# Patient Record
Sex: MALE | Race: WHITE | Employment: FULL TIME | URBAN - METROPOLITAN AREA
[De-identification: names, ages, dates, MRNs, and addresses within clinical notes are randomized per-mention and may not be internally consistent; named-entity substitution may affect disease eponyms.]

---

## 2017-03-13 PROBLEM — K21.9 GASTROESOPHAGEAL REFLUX DISEASE WITHOUT ESOPHAGITIS: Status: ACTIVE | Noted: 2017-03-13

## 2017-03-13 PROBLEM — F41.1 GAD (GENERALIZED ANXIETY DISORDER): Status: ACTIVE | Noted: 2017-03-13

## 2017-03-13 PROBLEM — E78.2 MIXED HYPERLIPIDEMIA: Status: ACTIVE | Noted: 2017-03-13

## 2017-09-22 PROBLEM — M54.9 CHRONIC BACK PAIN: Status: ACTIVE | Noted: 2017-09-22

## 2017-09-22 PROBLEM — G47.30 SLEEP APNEA: Status: ACTIVE | Noted: 2017-09-22

## 2017-09-22 PROBLEM — G89.29 CHRONIC BACK PAIN: Status: ACTIVE | Noted: 2017-09-22

## 2018-07-27 ENCOUNTER — HOSPITAL ENCOUNTER (OUTPATIENT)
Age: 58
Discharge: HOME | End: 2018-07-29

## 2018-07-27 PROCEDURE — 88342 IMHCHEM/IMCYTCHM 1ST ANTB: CPT

## 2018-07-27 PROCEDURE — 88305 TISSUE EXAM BY PATHOLOGIST: CPT

## 2019-05-07 ENCOUNTER — HOSPITAL ENCOUNTER (OUTPATIENT)
Age: 59
Discharge: HOME | End: 2019-05-09
Payer: COMMERCIAL

## 2019-05-07 PROCEDURE — 87088 URINE BACTERIA CULTURE: CPT

## 2019-05-09 LAB — URINE CULTURE, ROUTINE: NORMAL

## 2019-05-13 ENCOUNTER — HOSPITAL ENCOUNTER (OUTPATIENT)
Age: 59
Discharge: HOME | End: 2019-05-15

## 2019-05-13 LAB
ABO/RH: NORMAL
ANTIBODY SCREEN: NORMAL

## 2019-05-13 PROCEDURE — 86850 RBC ANTIBODY SCREEN: CPT

## 2019-05-13 PROCEDURE — 86901 BLOOD TYPING SEROLOGIC RH(D): CPT

## 2019-05-13 PROCEDURE — 86900 BLOOD TYPING SEROLOGIC ABO: CPT

## 2019-11-12 ENCOUNTER — HOSPITAL ENCOUNTER (OUTPATIENT)
Age: 59
Discharge: HOME | End: 2019-11-14
Payer: COMMERCIAL

## 2019-11-12 DIAGNOSIS — E78.1 HYPERTRIGLYCERIDEMIA: ICD-10-CM

## 2019-11-12 LAB
ALBUMIN SERPL-MCNC: 4.7 G/DL (ref 3.5–5.2)
ALP BLD-CCNC: 80 U/L (ref 40–129)
ALT SERPL-CCNC: 78 U/L (ref 0–40)
ANION GAP SERPL CALCULATED.3IONS-SCNC: 16 MMOL/L (ref 7–16)
AST SERPL-CCNC: 50 U/L (ref 0–39)
BILIRUB SERPL-MCNC: 0.3 MG/DL (ref 0–1.2)
BUN BLDV-MCNC: 16 MG/DL (ref 6–20)
CALCIUM SERPL-MCNC: 10 MG/DL (ref 8.6–10.2)
CHLORIDE BLD-SCNC: 101 MMOL/L (ref 98–107)
CHOLESTEROL, TOTAL: 187 MG/DL (ref 0–199)
CO2: 23 MMOL/L (ref 22–29)
CREAT SERPL-MCNC: 1.1 MG/DL (ref 0.7–1.2)
GFR AFRICAN AMERICAN: >60
GFR NON-AFRICAN AMERICAN: >60 ML/MIN/1.73
GLUCOSE BLD-MCNC: 123 MG/DL (ref 74–99)
HDLC SERPL-MCNC: 31 MG/DL
LDL CHOLESTEROL CALCULATED: 102 MG/DL (ref 0–99)
POTASSIUM SERPL-SCNC: 4.6 MMOL/L (ref 3.5–5)
SODIUM BLD-SCNC: 140 MMOL/L (ref 132–146)
TOTAL PROTEIN: 8 G/DL (ref 6.4–8.3)
TRIGL SERPL-MCNC: 268 MG/DL (ref 0–149)
VLDLC SERPL CALC-MCNC: 54 MG/DL

## 2019-11-12 PROCEDURE — 80061 LIPID PANEL: CPT

## 2019-11-12 PROCEDURE — 80053 COMPREHEN METABOLIC PANEL: CPT

## 2020-11-23 DIAGNOSIS — E78.2 MIXED HYPERLIPIDEMIA: ICD-10-CM

## 2020-11-23 PROBLEM — E66.813 CLASS 3 SEVERE OBESITY DUE TO EXCESS CALORIES WITH SERIOUS COMORBIDITY AND BODY MASS INDEX (BMI) OF 40.0 TO 44.9 IN ADULT (HCC): Status: ACTIVE | Noted: 2020-11-23

## 2020-11-23 PROBLEM — E66.01 CLASS 3 SEVERE OBESITY DUE TO EXCESS CALORIES WITH SERIOUS COMORBIDITY AND BODY MASS INDEX (BMI) OF 40.0 TO 44.9 IN ADULT (HCC): Status: ACTIVE | Noted: 2020-11-23

## 2020-11-23 PROBLEM — F17.210 SMOKING GREATER THAN 30 PACK YEARS: Status: ACTIVE | Noted: 2020-11-23

## 2020-11-23 LAB
ALBUMIN SERPL-MCNC: 4.7 G/DL (ref 3.5–5.2)
ALP BLD-CCNC: 68 U/L (ref 40–129)
ALT SERPL-CCNC: 62 U/L (ref 0–40)
ANION GAP SERPL CALCULATED.3IONS-SCNC: 13 MMOL/L (ref 7–16)
AST SERPL-CCNC: 42 U/L (ref 0–39)
BILIRUB SERPL-MCNC: 0.3 MG/DL (ref 0–1.2)
BUN BLDV-MCNC: 17 MG/DL (ref 8–23)
CALCIUM SERPL-MCNC: 10.3 MG/DL (ref 8.6–10.2)
CHLORIDE BLD-SCNC: 103 MMOL/L (ref 98–107)
CHOLESTEROL, TOTAL: 206 MG/DL (ref 0–199)
CO2: 27 MMOL/L (ref 22–29)
CREAT SERPL-MCNC: 1.1 MG/DL (ref 0.7–1.2)
GFR AFRICAN AMERICAN: >60
GFR NON-AFRICAN AMERICAN: >60 ML/MIN/1.73
GLUCOSE BLD-MCNC: 108 MG/DL (ref 74–99)
HDLC SERPL-MCNC: 43 MG/DL
LDL CHOLESTEROL CALCULATED: 140 MG/DL (ref 0–99)
POTASSIUM SERPL-SCNC: 4.8 MMOL/L (ref 3.5–5)
SODIUM BLD-SCNC: 143 MMOL/L (ref 132–146)
TOTAL PROTEIN: 7.5 G/DL (ref 6.4–8.3)
TRIGL SERPL-MCNC: 116 MG/DL (ref 0–149)
VLDLC SERPL CALC-MCNC: 23 MG/DL

## 2021-02-19 ENCOUNTER — TELEPHONE (OUTPATIENT)
Dept: ADMINISTRATIVE | Age: 61
End: 2021-02-19

## 2021-02-19 NOTE — TELEPHONE ENCOUNTER
Patient Appointment Form:      PCP: Dr. Daniela Katz  Referring: Dr. Daniela Katz    Has the Patient:    Seen a Cardiologist? yes    date:7/9/14  Physician:Pt can't remember  location:[facility]    Had a heart catheterization? no    Had heart surgery? no    Had a stress test or nuclear stress test? yes   date: 7/9/14   facility name:  [address] [place] [address]    Had an echocardiogram? no    Had a vascular ultrasound? no    Had a 24/48 heart monitor or extended cardiac event monitor? no    Had recent blood work in the last 6 months? yes    date: 11/23/20    ordering physician: Bowen Lainez    Had a pacemaker/ICD/ILR implant? no    Seen an Electrophysiologist? no        Will send records via: in [address] [place] [address]      Date & time of appointment:  2/25/21 @ 1:00

## 2021-02-25 ENCOUNTER — OFFICE VISIT (OUTPATIENT)
Dept: CARDIOLOGY CLINIC | Age: 61
End: 2021-02-25
Payer: COMMERCIAL

## 2021-02-25 VITALS
SYSTOLIC BLOOD PRESSURE: 138 MMHG | HEART RATE: 74 BPM | DIASTOLIC BLOOD PRESSURE: 70 MMHG | HEIGHT: 70 IN | RESPIRATION RATE: 18 BRPM | WEIGHT: 284.9 LBS | BODY MASS INDEX: 40.79 KG/M2

## 2021-02-25 DIAGNOSIS — R07.9 CHEST PAIN, UNSPECIFIED TYPE: ICD-10-CM

## 2021-02-25 DIAGNOSIS — I10 ESSENTIAL HYPERTENSION: Primary | ICD-10-CM

## 2021-02-25 PROCEDURE — 93000 ELECTROCARDIOGRAM COMPLETE: CPT | Performed by: INTERNAL MEDICINE

## 2021-02-25 PROCEDURE — 99244 OFF/OP CNSLTJ NEW/EST MOD 40: CPT | Performed by: INTERNAL MEDICINE

## 2021-02-25 RX ORDER — ASPIRIN 81 MG/1
81 TABLET ORAL DAILY
COMMUNITY

## 2021-02-25 NOTE — PROGRESS NOTES
OUTPATIENT CARDIOLOGY CONSULT    Name: Cosmo Goltz    Age: 61 y.o. Date of Service: 2021    Reason for Consultation: Chest pain    Referring Physician: Jenny Michelle MD    History of Present Illness:  Cosmo Goltz is a 61 y.o. male who presents today for further evaluation of chest pain. He has multiple cardiac risk factors including obesity, hypertension, hyperlipidemia and family history of premature CAD. He is going through a separation from his girlfriend of 19 years. With all this going on, he has been getting some left-sided chest pressure and tightness, not exertional but has been brought on with stress. Some radiation to the back, no other associated symptoms. Last about 15 minutes. Last stress test was in . He tells me he is flying to [place] on Tuesday of next week. Review of Systems:  Complete review of systems otherwise negative except as described above.     Past Medical History:  Past Medical History:   Diagnosis Date    BMI 40.0-44.9, adult ([facility] [address] 2017    Chronic back pain     TIM (generalized anxiety disorder)     GERD (gastroesophageal reflux disease)     Hyperlipidemia     Hypertension     Sleep apnea        Past Surgical History:  Past Surgical History:   Procedure Laterality Date    APPENDECTOMY      COLONOSCOPY         Family History:  Family History   Problem Relation Age of Onset    Other Mother         dementia    Heart Disease Mother     Heart Disease Father     No Known Problems Sister     No Known Problems Brother     No Known Problems Sister     No Known Problems Sister    Father MI at 50  Mom valve replacement 62s      Social History:  Social History     Tobacco Use    Smoking status: Former Smoker     Packs/day: 3.00     Years: 20.00     Pack years: 60.00     Types: Cigars     Quit date: 10/3/1994     Years since quittin.4    Smokeless tobacco: Never Used    Tobacco comment: smokes 2 cigars a month and quit cigs 23 years ago   Substance Use Topics    Alcohol use: Yes     Comment: social     Drug use: No        Allergies: Allergies   Allergen Reactions    Shrimp Flavor Hives       Current Medications:    Current Outpatient Medications:     aspirin 81 MG EC tablet, Take 81 mg by mouth daily, Disp: , Rfl:     naproxen (NAPROSYN) 500 MG tablet, Take 1 tablet by mouth 2 times daily (with meals) (Patient taking differently: Take 500 mg by mouth as needed ), Disp: 180 tablet, Rfl: 3    omeprazole (PRILOSEC) 20 MG delayed release capsule, Take 1 capsule by mouth daily, Disp: 90 capsule, Rfl: 3    rosuvastatin (CRESTOR) 20 MG tablet, Take 1 tablet by mouth daily, Disp: 90 tablet, Rfl: 3    telmisartan (MICARDIS) 80 MG tablet, Take 1 tablet by mouth daily, Disp: 90 tablet, Rfl: 3    fenofibrate (TRIGLIDE) 160 MG tablet, TAKE 1 TABLET DAILY, Disp: 90 tablet, Rfl: 3    Cholecalciferol (VITAMIN D3) 5000 units TABS, Take 5,000 Units by mouth daily, Disp: , Rfl:     Omega-3 Fatty Acids (FISH OIL) 1000 MG CAPS, Take 1,000 mg by mouth daily, Disp: , Rfl:     CPAP Machine MISC, Please provide patient with an auto CPAP with ranges 5-15 cm water pressure with C-Flex of 3 and heated humidification. Please also provide mask, tubing, and supplies to patient fit and comfort. Dx:[facility]CH Orders faxed to [facility], Disp: 1 each, Rfl: 0    Physical Exam:  /70   Pulse 74   Resp 18   Ht 5' 10\" (1.778 m)   Wt 284 lb 14.4 oz (129.2 kg)   BMI 40.88 kg/m²   Wt Readings from Last 3 Encounters:   02/25/21 284 lb 14.4 oz (129.2 kg)   02/19/21 287 lb (130.2 kg)   11/23/20 282 lb (127.9 kg)     Appearance: Overweight male, awake, alert, no acute respiratory distress  Skin: Intact, no rash  Eyes: EOMI, no conjunctival erythema  ENMT: Moist mucous membranes. Neck: Supple, no elevated JVP, no carotid bruits  Lungs: Clear to auscultation bilaterally. No wheezes, rales, or rhonchi. Cardiac: Regular rhythm with a normal rate.   S1 & S2 normal, no murmurs  Abdomen: Soft, nontender, +bowel sounds  Extremities: Moves all extremities x 4, no lower extremity edema  Neurologic: No focal motor deficits apparent, normal mood and affect  Peripheral Pulses: Intact posterior tibial pulses bilaterally    Laboratory Tests:  Lab Results   Component Value Date    CREATININE 1.1 11/23/2020    BUN 17 11/23/2020     11/23/2020    K 4.8 11/23/2020     11/23/2020    CO2 27 11/23/2020     Lab Results   Component Value Date    MG 2.1 12/18/2017     Lab Results   Component Value Date    WBC 7.6 03/06/2017    HGB 15.3 03/06/2017    HCT 45.9 03/06/2017    MCV 90.9 03/06/2017     03/06/2017     Lab Results   Component Value Date    ALT 62 (H) 11/23/2020    AST 42 (H) 11/23/2020    ALKPHOS 68 11/23/2020    BILITOT 0.3 11/23/2020     Lab Results   Component Value Date    CKTOTAL 253 (A) 12/18/2017     No results found for: INR, PROTIME  Lab Results   Component Value Date    TSH 2.590 03/06/2017     Lab Results   Component Value Date    LABA1C 5.6 06/19/2018     No results found for: EAG  Lab Results   Component Value Date    CHOL 206 (H) 11/23/2020    CHOL 187 11/12/2019    CHOL 185 06/19/2018     Lab Results   Component Value Date    TRIG 116 11/23/2020    TRIG 268 (H) 11/12/2019    TRIG 171 (A) 06/19/2018     Lab Results   Component Value Date    HDL 43 11/23/2020    HDL 31 11/12/2019    HDL 33 (A) 06/19/2018     Lab Results   Component Value Date    LDLCALC 140 (H) 11/23/2020    LDLCALC 102 (H) 11/12/2019    LDLCALC 123 (A) 06/19/2018     Lab Results   Component Value Date    LABVLDL 23 11/23/2020    LABVLDL 54 11/12/2019    LABVLDL - (AA) 03/06/2017     Lab Results   Component Value Date    CHOLHDLRATIO 5.6 (A) 06/19/2018    CHOLHDLRATIO 5.4 12/18/2017     No results for input(s): PROBNP in the last 72 hours. Cardiac Tests:  ECG: Sinus rhythm 74 bpm.  Normal axis normal intervals. No ST or T wave changes.     Echocardiogram:     Stress test:    Exercise treadmill stress test 2014  9 minutes Yariel protocol, 97% MPHR, 10.1 METS  No ST changes, low risk study    Cardiac catheterization:     Orders Placed This Encounter   Procedures    NM Cardiac Stress Test Nuclear Imaging    Cardiac Stress Test Exercise- Treadmill    EKG 12 Lead        Requested Prescriptions      No prescriptions requested or ordered in this encounter        ASSESSMENT / PLAN:  1. Chest pain, multiple cardiac risk factors. Baseline EKG unremarkable  2. Hypertension, running slightly high  3. Hyperlipidemia, on statin  4. Obstructive sleep apnea, compliant with CPAP  5. Obesity, BMI 40.9 kg/m²  6. GERD  7. Chronic back pain  8. Prior tobacco abuse    Recommendations:  Repeat ischemic evaluation is warranted. · Exercise nuclear stress test  · Continue aspirin statin  · Ideally would get this done before he travels to [place] and this was explained to him  · Aggressive risk factor modification including weight loss recommended  · Further recommendations pending above    Thank you for allowing me to participate in your patient's care. Please feel free to contact me if you have any questions or concerns.       Gregory Thomas MD, [facility] Card[facility]

## 2021-02-26 ENCOUNTER — TELEPHONE (OUTPATIENT)
Dept: CARDIOLOGY | Age: 61
End: 2021-02-26

## 2021-02-26 NOTE — TELEPHONE ENCOUNTER
02/26/2021, pt given nuclear tm instructions for 3/01/21and covid checklist reviewed as well sharon rn

## 2021-03-01 ENCOUNTER — HOSPITAL ENCOUNTER (OUTPATIENT)
Dept: CARDIOLOGY | Age: 61
Discharge: HOME | End: 2021-03-01
Payer: COMMERCIAL

## 2021-03-01 VITALS
BODY MASS INDEX: 40.66 KG/M2 | OXYGEN SATURATION: 98 % | TEMPERATURE: 96.9 F | WEIGHT: 284 LBS | SYSTOLIC BLOOD PRESSURE: 122 MMHG | DIASTOLIC BLOOD PRESSURE: 80 MMHG | RESPIRATION RATE: 20 BRPM | HEART RATE: 78 BPM | HEIGHT: 70 IN

## 2021-03-01 DIAGNOSIS — R07.9 CHEST PAIN, UNSPECIFIED TYPE: ICD-10-CM

## 2021-03-01 LAB
LV EF: 71 %
LVEF MODALITY: NORMAL

## 2021-03-01 PROCEDURE — 93017 CV STRESS TEST TRACING ONLY: CPT

## 2021-03-01 PROCEDURE — 3430000000 HC RX DIAGNOSTIC RADIOPHARMACEUTICAL: Performed by: INTERNAL MEDICINE

## 2021-03-01 PROCEDURE — 99999 PR OFFICE/OUTPT VISIT,PROCEDURE ONLY: CPT | Performed by: INTERNAL MEDICINE

## 2021-03-01 PROCEDURE — A9502 TC99M TETROFOSMIN: HCPCS | Performed by: INTERNAL MEDICINE

## 2021-03-01 PROCEDURE — 78452 HT MUSCLE IMAGE SPECT MULT: CPT

## 2021-03-01 PROCEDURE — 2580000003 HC RX 258: Performed by: INTERNAL MEDICINE

## 2021-03-01 RX ORDER — SODIUM CHLORIDE 0.9 % (FLUSH) 0.9 %
10 SYRINGE (ML) INJECTION PRN
Status: DISCONTINUED | OUTPATIENT
Start: 2021-03-01 | End: 2021-03-02 | Stop reason: HOSPADM

## 2021-03-01 RX ADMIN — TETROFOSMIN 30.9 MILLICURIE: 0.23 INJECTION, POWDER, LYOPHILIZED, FOR SOLUTION INTRAVENOUS at 09:38

## 2021-03-01 RX ADMIN — TETROFOSMIN 11.2 MILLICURIE: 0.23 INJECTION, POWDER, LYOPHILIZED, FOR SOLUTION INTRAVENOUS at 07:46

## 2021-03-01 RX ADMIN — SODIUM CHLORIDE, PRESERVATIVE FREE 10 ML: 5 INJECTION INTRAVENOUS at 07:46

## 2021-03-01 RX ADMIN — SODIUM CHLORIDE, PRESERVATIVE FREE 10 ML: 5 INJECTION INTRAVENOUS at 09:38

## 2021-03-01 NOTE — PROCEDURES
[id number] [facility] [address] and Vascular Lab - [address]. Yuri wilson, [address] [place] [address]  [phone]                  Exercise Stress Nuclear Gated SPECT Study    Name: Ryonet Account Number: [de-identified]    :  1960      Sex: male              Date of Study:  3/1/2021    Height: 5' 10\" (177.8 cm)  Weight: 284 lb (128.8 kg)     Ordering Provider: Charlcie Heimlich Cuadra,MD          PCP: Carolina Cardona MD      Cardiologist: Jacqueline Zacarias MD                        Interpreting Physician: Pal Berg MD  _________________________________________________________________________________    Indication:   Chest Pain    Clinical History:   Patient has no known history of coronary artery disease. Resting ECG:    SR and is normal    Exercise: The patient exercised using a Yariel protocol, completing 7:01 minutes and reaching an estimated work load of 95.8 metabolic equivalents (METS). Resting HR was 80. Peak exercise heart rate was 154 ( 96% of maximum predicted heart rate for age). Baseline /80. Peak exercise /70. The blood pressure response to exercise was normal      Exercise was terminated due to heart rate attained. The patient experienced chest tightnes and mild shortness of breath with exercise, which improved with rest.       Pulse oximetry was used to monitor oxygen saturation during the stress test.  The study was performed on Room Air. The resting pulse oximeter was 98%. The post stress O2 saturation seen during exercise was 97 %. Exercise ECG:   The patient demonstrated no arrhythmias during exercise. With exercise, there were no ST segment changes of significance at the heart rate achieved. IMAGING: Myocardial perfusion imaging was performed at rest 30-35 minutes following the intravenous injection of 11.2 mCi of (Tc-tetrofosmin) followed by 10 ml of Normal Saline.   At peak exercise, the patient was injected intravenously with 30.9 mCi of (Tc-tetrofosmin) followed by 10 ml of Normal Saline. Gated post-stress tomographic imaging was performed 20-25 minutes after stress. FINDINGS: The overall quality of the study was good. Left ventricular cavity size was noted to be mildly enlarged on both rest and stress studies. Rotational analog analysis demonstrated abnormal patient motion. A mild defect was present in the inferior wall(s) that was  small size on the post stress images. The resting images  show no change. Gated SPECT left ventricular ejection fraction was calculated to be 71%, with normal myocardial thickening and wall motion. Impression:    1. Exercise EKG was normal.  2. The patient experienced chest tightness with exercise not typical of angina. 3. The myocardial perfusion imaging was normal with attenuation artifact. 4. Overall left ventricular systolic function was normal without regional wall motion abnormalities. 5. Exercise capacity was average. 6. Low risk general exercise nuclear stress test.    Thank you for sending your patient to this [employer].      Electronically signed by Jojo Brooks MD on 3/1/2021 at 4:44 PM

## 2021-03-02 ENCOUNTER — APPOINTMENT (OUTPATIENT)
Dept: GENERAL RADIOLOGY | Age: 61
End: 2021-03-02
Payer: COMMERCIAL

## 2021-03-02 ENCOUNTER — HOSPITAL ENCOUNTER (OUTPATIENT)
Age: 61
Setting detail: OBSERVATION
Discharge: HOME | End: 2021-03-03
Attending: EMERGENCY MEDICINE | Admitting: INTERNAL MEDICINE
Payer: COMMERCIAL

## 2021-03-02 DIAGNOSIS — R07.9 CHEST PAIN, UNSPECIFIED TYPE: Primary | ICD-10-CM

## 2021-03-02 LAB
ALBUMIN SERPL-MCNC: 4.5 G/DL (ref 3.5–5.2)
ALP BLD-CCNC: 51 U/L (ref 40–129)
ALT SERPL-CCNC: 51 U/L (ref 0–40)
ANION GAP SERPL CALCULATED.3IONS-SCNC: 10 MMOL/L (ref 7–16)
APTT: 33.8 SEC (ref 24.5–35.1)
AST SERPL-CCNC: 39 U/L (ref 0–39)
BASOPHILS ABSOLUTE: 0.02 E9/L (ref 0–0.2)
BASOPHILS RELATIVE PERCENT: 0.3 % (ref 0–2)
BILIRUB SERPL-MCNC: 0.5 MG/DL (ref 0–1.2)
BUN BLDV-MCNC: 20 MG/DL (ref 8–23)
CALCIUM SERPL-MCNC: 9.7 MG/DL (ref 8.6–10.2)
CHLORIDE BLD-SCNC: 102 MMOL/L (ref 98–107)
CO2: 27 MMOL/L (ref 22–29)
CREAT SERPL-MCNC: 1.1 MG/DL (ref 0.7–1.2)
EKG ATRIAL RATE: 76 BPM
EKG P AXIS: 40 DEGREES
EKG P-R INTERVAL: 168 MS
EKG Q-T INTERVAL: 382 MS
EKG QRS DURATION: 94 MS
EKG QTC CALCULATION (BAZETT): 429 MS
EKG R AXIS: 14 DEGREES
EKG T AXIS: 43 DEGREES
EKG VENTRICULAR RATE: 76 BPM
EOSINOPHILS ABSOLUTE: 0.17 E9/L (ref 0.05–0.5)
EOSINOPHILS RELATIVE PERCENT: 2.8 % (ref 0–6)
GFR AFRICAN AMERICAN: >60
GFR NON-AFRICAN AMERICAN: >60 ML/MIN/1.73
GLUCOSE BLD-MCNC: 129 MG/DL (ref 74–99)
HCT VFR BLD CALC: 46.1 % (ref 37–54)
HEMOGLOBIN: 15.6 G/DL (ref 12.5–16.5)
IMMATURE GRANULOCYTES #: 0.02 E9/L
IMMATURE GRANULOCYTES %: 0.3 % (ref 0–5)
INR BLD: 1.1
LYMPHOCYTES ABSOLUTE: 1.43 E9/L (ref 1.5–4)
LYMPHOCYTES RELATIVE PERCENT: 23.3 % (ref 20–42)
MCH RBC QN AUTO: 30.7 PG (ref 26–35)
MCHC RBC AUTO-ENTMCNC: 33.8 % (ref 32–34.5)
MCV RBC AUTO: 90.7 FL (ref 80–99.9)
MONOCYTES ABSOLUTE: 0.54 E9/L (ref 0.1–0.95)
MONOCYTES RELATIVE PERCENT: 8.8 % (ref 2–12)
NEUTROPHILS ABSOLUTE: 3.96 E9/L (ref 1.8–7.3)
NEUTROPHILS RELATIVE PERCENT: 64.5 % (ref 43–80)
PDW BLD-RTO: 13.4 FL (ref 11.5–15)
PLATELET # BLD: 255 E9/L (ref 130–450)
PMV BLD AUTO: 10.4 FL (ref 7–12)
POTASSIUM SERPL-SCNC: 4.2 MMOL/L (ref 3.5–5)
PROTHROMBIN TIME: 11.8 SEC (ref 9.3–12.4)
RBC # BLD: 5.08 E12/L (ref 3.8–5.8)
SODIUM BLD-SCNC: 139 MMOL/L (ref 132–146)
TOTAL PROTEIN: 7.2 G/DL (ref 6.4–8.3)
TROPONIN: <0.01 NG/ML (ref 0–0.03)
WBC # BLD: 6.1 E9/L (ref 4.5–11.5)

## 2021-03-02 PROCEDURE — 96374 THER/PROPH/DIAG INJ IV PUSH: CPT

## 2021-03-02 PROCEDURE — 85730 THROMBOPLASTIN TIME PARTIAL: CPT

## 2021-03-02 PROCEDURE — 71260 CT THORAX DX C+: CPT

## 2021-03-02 PROCEDURE — 85610 PROTHROMBIN TIME: CPT

## 2021-03-02 PROCEDURE — 99285 EMERGENCY DEPT VISIT HI MDM: CPT

## 2021-03-02 PROCEDURE — 71045 X-RAY EXAM CHEST 1 VIEW: CPT

## 2021-03-02 PROCEDURE — 93005 ELECTROCARDIOGRAM TRACING: CPT | Performed by: EMERGENCY MEDICINE

## 2021-03-02 PROCEDURE — 85025 COMPLETE CBC W/AUTO DIFF WBC: CPT

## 2021-03-02 PROCEDURE — 36415 COLL VENOUS BLD VENIPUNCTURE: CPT

## 2021-03-02 PROCEDURE — 6360000002 HC RX W HCPCS: Performed by: EMERGENCY MEDICINE

## 2021-03-02 PROCEDURE — 84484 ASSAY OF TROPONIN QUANT: CPT

## 2021-03-02 PROCEDURE — 6370000000 HC RX 637 (ALT 250 FOR IP): Performed by: EMERGENCY MEDICINE

## 2021-03-02 PROCEDURE — G0378 HOSPITAL OBSERVATION PER HR: HCPCS

## 2021-03-02 PROCEDURE — 96375 TX/PRO/DX INJ NEW DRUG ADDON: CPT

## 2021-03-02 PROCEDURE — 6360000004 HC RX CONTRAST MEDICATION: Performed by: RADIOLOGY

## 2021-03-02 PROCEDURE — 80053 COMPREHEN METABOLIC PANEL: CPT

## 2021-03-02 PROCEDURE — 6370000000 HC RX 637 (ALT 250 FOR IP): Performed by: INTERNAL MEDICINE

## 2021-03-02 PROCEDURE — 93010 ELECTROCARDIOGRAM REPORT: CPT | Performed by: INTERNAL MEDICINE

## 2021-03-02 RX ORDER — METHYLPREDNISOLONE SODIUM SUCCINATE 125 MG/2ML
125 INJECTION, POWDER, LYOPHILIZED, FOR SOLUTION INTRAMUSCULAR; INTRAVENOUS ONCE
Status: COMPLETED | OUTPATIENT
Start: 2021-03-02 | End: 2021-03-02

## 2021-03-02 RX ORDER — ASPIRIN 81 MG/1
324 TABLET, CHEWABLE ORAL ONCE
Status: COMPLETED | OUTPATIENT
Start: 2021-03-02 | End: 2021-03-02

## 2021-03-02 RX ORDER — DIPHENHYDRAMINE HYDROCHLORIDE 50 MG/ML
25 INJECTION INTRAMUSCULAR; INTRAVENOUS ONCE
Status: COMPLETED | OUTPATIENT
Start: 2021-03-02 | End: 2021-03-02

## 2021-03-02 RX ORDER — MORPHINE SULFATE 4 MG/ML
4 INJECTION, SOLUTION INTRAMUSCULAR; INTRAVENOUS ONCE
Status: COMPLETED | OUTPATIENT
Start: 2021-03-02 | End: 2021-03-02

## 2021-03-02 RX ORDER — ASPIRIN 81 MG/1
81 TABLET ORAL DAILY
Status: DISCONTINUED | OUTPATIENT
Start: 2021-03-02 | End: 2021-03-03 | Stop reason: HOSPADM

## 2021-03-02 RX ORDER — FENOFIBRATE 160 MG/1
160 TABLET ORAL DAILY
Refills: 3 | Status: DISCONTINUED | OUTPATIENT
Start: 2021-03-02 | End: 2021-03-03 | Stop reason: HOSPADM

## 2021-03-02 RX ORDER — LOSARTAN POTASSIUM 50 MG/1
50 TABLET ORAL DAILY
Refills: 3 | Status: DISCONTINUED | OUTPATIENT
Start: 2021-03-02 | End: 2021-03-03 | Stop reason: HOSPADM

## 2021-03-02 RX ORDER — ROSUVASTATIN CALCIUM 20 MG/1
20 TABLET, COATED ORAL DAILY
Status: DISCONTINUED | OUTPATIENT
Start: 2021-03-02 | End: 2021-03-03 | Stop reason: HOSPADM

## 2021-03-02 RX ORDER — KETOROLAC TROMETHAMINE 30 MG/ML
15 INJECTION, SOLUTION INTRAMUSCULAR; INTRAVENOUS ONCE
Status: COMPLETED | OUTPATIENT
Start: 2021-03-02 | End: 2021-03-02

## 2021-03-02 RX ORDER — VITAMIN B COMPLEX
5000 TABLET ORAL DAILY
Status: DISCONTINUED | OUTPATIENT
Start: 2021-03-02 | End: 2021-03-03 | Stop reason: HOSPADM

## 2021-03-02 RX ORDER — PANTOPRAZOLE SODIUM 40 MG/1
40 TABLET, DELAYED RELEASE ORAL
Status: DISCONTINUED | OUTPATIENT
Start: 2021-03-03 | End: 2021-03-03 | Stop reason: HOSPADM

## 2021-03-02 RX ADMIN — METHYLPREDNISOLONE SODIUM SUCCINATE 125 MG: 125 INJECTION, POWDER, FOR SOLUTION INTRAMUSCULAR; INTRAVENOUS at 09:10

## 2021-03-02 RX ADMIN — ROSUVASTATIN 20 MG: 20 TABLET, FILM COATED ORAL at 19:23

## 2021-03-02 RX ADMIN — MORPHINE SULFATE 4 MG: 4 INJECTION, SOLUTION INTRAMUSCULAR; INTRAVENOUS at 09:09

## 2021-03-02 RX ADMIN — KETOROLAC TROMETHAMINE 15 MG: 30 INJECTION, SOLUTION INTRAMUSCULAR at 11:39

## 2021-03-02 RX ADMIN — LOSARTAN POTASSIUM 50 MG: 50 TABLET, FILM COATED ORAL at 19:23

## 2021-03-02 RX ADMIN — Medication 5000 UNITS: at 19:23

## 2021-03-02 RX ADMIN — DIPHENHYDRAMINE HYDROCHLORIDE 25 MG: 50 INJECTION, SOLUTION INTRAMUSCULAR; INTRAVENOUS at 09:07

## 2021-03-02 RX ADMIN — FENOFIBRATE 160 MG: 160 TABLET ORAL at 19:23

## 2021-03-02 RX ADMIN — ASPIRIN 324 MG: 81 TABLET, CHEWABLE ORAL at 09:17

## 2021-03-02 RX ADMIN — ASPIRIN 81 MG: 81 TABLET, COATED ORAL at 19:23

## 2021-03-02 RX ADMIN — IOPAMIDOL 75 ML: 755 INJECTION, SOLUTION INTRAVENOUS at 10:35

## 2021-03-02 ASSESSMENT — PAIN DESCRIPTION - ONSET: ONSET: ON-GOING

## 2021-03-02 ASSESSMENT — PAIN SCALES - GENERAL
PAINLEVEL_OUTOF10: 0
PAINLEVEL_OUTOF10: 7
PAINLEVEL_OUTOF10: 9

## 2021-03-02 NOTE — ED NOTES
States pain is slightly better in chest, but still very uncomfortable in back. Dr. Bernard Carr notified.       Stanford Vance RN  03/02/21 0106

## 2021-03-02 NOTE — PROGRESS NOTES
patient alert and oriented denies any chest pain at this time. Belongings with patient include shoes clothes two wallets with cash in them and cpap machine, phone  ,phone , I pad. No skin issues noted. paitent oriented to room with no limitations.

## 2021-03-02 NOTE — ED NOTES
Returned from radiology. No new c/o at this time. Back pain continues. Awaiting results/further orders.      Nano Wilder RN  03/02/21 [id number]

## 2021-03-03 VITALS
TEMPERATURE: 96.9 F | RESPIRATION RATE: 18 BRPM | BODY MASS INDEX: 40.09 KG/M2 | HEIGHT: 70 IN | WEIGHT: 280 LBS | HEART RATE: 70 BPM | OXYGEN SATURATION: 98 % | SYSTOLIC BLOOD PRESSURE: 121 MMHG | DIASTOLIC BLOOD PRESSURE: 79 MMHG

## 2021-03-03 PROCEDURE — APPSS60 APP SPLIT SHARED TIME 46-60 MINUTES: Performed by: PHYSICIAN ASSISTANT

## 2021-03-03 PROCEDURE — 6370000000 HC RX 637 (ALT 250 FOR IP): Performed by: INTERNAL MEDICINE

## 2021-03-03 PROCEDURE — G0378 HOSPITAL OBSERVATION PER HR: HCPCS

## 2021-03-03 PROCEDURE — 99244 OFF/OP CNSLTJ NEW/EST MOD 40: CPT | Performed by: INTERNAL MEDICINE

## 2021-03-03 RX ORDER — ISOSORBIDE MONONITRATE 60 MG/1
60 TABLET, EXTENDED RELEASE ORAL DAILY
Status: DISCONTINUED | OUTPATIENT
Start: 2021-03-03 | End: 2021-03-03 | Stop reason: HOSPADM

## 2021-03-03 RX ORDER — METOPROLOL SUCCINATE 25 MG/1
25 TABLET, EXTENDED RELEASE ORAL DAILY
Qty: 30 TABLET | Refills: 3 | Status: SHIPPED
Start: 2021-03-03 | End: 2021-03-09 | Stop reason: ALTCHOICE

## 2021-03-03 RX ORDER — METOPROLOL SUCCINATE 25 MG/1
25 TABLET, EXTENDED RELEASE ORAL DAILY
Status: DISCONTINUED | OUTPATIENT
Start: 2021-03-03 | End: 2021-03-03 | Stop reason: HOSPADM

## 2021-03-03 RX ORDER — ISOSORBIDE MONONITRATE 60 MG/1
60 TABLET, EXTENDED RELEASE ORAL DAILY
Qty: 30 TABLET | Refills: 3 | Status: SHIPPED
Start: 2021-03-03 | End: 2021-03-09 | Stop reason: ALTCHOICE

## 2021-03-03 RX ADMIN — PANTOPRAZOLE SODIUM 40 MG: 40 TABLET, DELAYED RELEASE ORAL at 06:40

## 2021-03-03 NOTE — PROGRESS NOTES
CLINICAL PHARMACY NOTE: MEDS TO [address] Select Patient?: No  Total # of Prescriptions Filled: 2   The following medications were delivered to the patient:  · Metoprolol succinate 25 mg  · Isosorbide mononitrate 60 mg  Total # of Interventions Completed: 2  Time Spent (min): 15    Additional Documentation:

## 2021-03-03 NOTE — PROGRESS NOTES
Dr. Wallace [facility] notified via perfect serve regarding cardiology recommending patient to discharge on Protonix 40 mg PO daily, RN was instructed that patient will discharge on his Prilosec.     Neymar Brandt RN

## 2021-03-03 NOTE — CONSULTS
was normal without regional wall motion abnormalities. Exercise capacity was average. 6. Low risk general exercise nuclear stress test.   3. Hypertension  4. Hyperlipidemia   5. Obesity  6. Gastroesophageal reflux disease  7. Chronic back pain with history of back surgery  8. Previous tobacco use  9. Family history of coronary disease  10. H/o appendectomy    Medications Prior to admit:  Prior to Admission medications    Medication Sig Start Date End Date Taking? Authorizing Provider   aspirin 81 MG EC tablet Take 81 mg by mouth daily   Yes Historical Provider, MD   naproxen (NAPROSYN) 500 MG tablet Take 1 tablet by mouth 2 times daily (with meals)  Patient taking differently: Take 500 mg by mouth as needed  11/23/20  Yes Nathaniel Voss MD   omeprazole (PRILOSEC) 20 MG delayed release capsule Take 1 capsule by mouth daily 11/23/20  Yes Nathaniel Voss MD   rosuvastatin (CRESTOR) 20 MG tablet Take 1 tablet by mouth daily 11/23/20  Yes Nathaniel Voss MD   telmisartan (MICARDIS) 80 MG tablet Take 1 tablet by mouth daily 11/23/20  Yes Nathaniel Voss MD   fenofibrate (TRIGLIDE) 160 MG tablet TAKE 1 TABLET DAILY 11/2/20  Yes Nathaniel Voss MD   Cholecalciferol (VITAMIN D3) 5000 units TABS Take 5,000 Units by mouth daily   Yes Historical Provider, MD   Omega-3 Fatty Acids (FISH OIL) 1000 MG CAPS Take 1,000 mg by mouth daily   Yes Historical Provider, MD   CPAP Machine MISC Please provide patient with an auto CPAP with ranges 5-15 cm water pressure with C-Flex of 3 and heated humidification. Please also provide mask, tubing, and supplies to patient fit and comfort.   Dx:BUTCH  Orders faxed to [facility] [place]  Yes Sonido Ritter MD       Current Medications:    Current Facility-Administered Medications: Vitamin D (CHOLECALCIFEROL) tablet 5,000 Units, 5,000 Units, Oral, Daily  fenofibrate (TRIGLIDE) tablet 160 mg, 160 mg, Oral, Daily  pantoprazole (PROTONIX) tablet 40 mg, 40 mg, Oral, QAM AC  rosuvastatin (CRESTOR) tablet 20 mg, 20 mg, Oral, Daily  losartan (COZAAR) tablet 50 mg, 50 mg, Oral, Daily  aspirin EC tablet 81 mg, 81 mg, Oral, Daily    Allergies:  Shrimp flavor    Social History:    Lives alone in a single-story home. Denies any chest pain or shortness of breath with activities of daily living. Does not use supplemental oxygen or ambulation assistance devices. Previous tobacco abuse: Quit  after 20 years  Denies alcohol or illicit drug use    No CODE STATUS on file    Family History:   Father, CABG age 44-55 (smoker),  age 67 from myocardial infarction  Mother, valvular heart disease      REVIEW OF SYSTEMS:     · Constitutional: Denies fevers, chills, night sweats, and fatigue  · HEENT: Denies headaches, nose bleeds, and blurred vision,oral pain, abscess or lesion. · Musculoskeletal: Denies falls, pain to BLE with ambulation and edema to BLE. · Neurological: Denies dizziness and lightheadedness, numbness and tingling  · Cardiovascular: + chest pain, denies palpitations, and feelings of heart racing. · Respiratory: Denies orthopnea and PND, cough, JOHNSON  · Gastrointestinal: Denies heartburn, nausea/vomiting, diarrhea and constipation, black/bloody, and tarry stools. · Genitourinary: Denies dysuria and hematuria  · Hematologic: Denies excessive bruising or bleeding  · Lymphatic: Denies lumps and bumps to neck, axilla, breast, and groin      PHYSICAL EXAM:   /82   Pulse 88   Temp 97.4 °F (36.3 °C) (Temporal)   Resp 18   Ht 5' 10\" (1.778 m)   Wt 280 lb (127 kg)   SpO2 97%   BMI 40.18 kg/m²   CONST:  Well developed, obese  male who appears his stated age. Awake, alert, cooperative, no apparent distress  HEENT:   Head- Normocephalic, atraumatic   Eyes- Conjunctivae pink, anicteric  Throat- Oral mucosa pink and moist  Neck-  No stridor, trachea midline, no jugular venous distention. CHEST: Chest symmetrical and non-tender to palpation.    RESPIRATORY: Lung sounds clear throughout fields bilaterally. No wheeze or rhonchi noted. CARDIOVASCULAR:     No carotid bruit noted bilaterally   Heart Ausculation- Regular rate and rhythm, no murmur. PV: No lower extremity edema. No varicosities. ABDOMEN: Soft, non-tender to light palpation. Bowel sounds present. MS: Good muscle strength and tone. No atrophy or abnormal movements. : Deferred   SKIN: Warm and dry no statis dermatitis or ulcers   NEURO / PSYCH: Oriented to person, place and time. Speech clear and appropriate. Follows all commands. Pleasant affect     DATA:    ECG: Sinus rhythm heart rate 76 no acute ischemic changes  Tele strips: Sinus rhythm heart rate in the 80s  Diagnostic:      Intake/Output Summary (Last 24 hours) at 3/3/2021 0811  Last data filed at 3/2/2021 1915  Gross per 24 hour   Intake 240 ml   Output --   Net 240 ml       Labs:   CBC:   Recent Labs     03/02/21  0916   WBC 6.1   HGB 15.6   HCT 46.1        BMP:   Recent Labs     03/02/21  0916      K 4.2   CO2 27   BUN 20   CREATININE 1.1   LABGLOM >60   CALCIUM 9.7     HgA1c:   Lab Results   Component Value Date    LABA1C 5.6 06/19/2018     PT/INR:   Recent Labs     03/02/21  0916   PROTIME 11.8   INR 1.1     APTT:  Recent Labs     03/02/21  0916   APTT 33.8     CARDIAC ENZYMES:  Recent Labs     03/02/21  1307 03/02/21  1637 03/02/21  2149   TROPONINI <0.01 <0.01 <0.01     FASTING LIPID PANEL:  Lab Results   Component Value Date    CHOL 206 11/23/2020    HDL 43 11/23/2020    LDLCALC 140 11/23/2020    TRIG 116 11/23/2020     LIVER PROFILE:  Recent Labs     03/02/21  0916   AST 39   ALT 51*   LABALBU 4.5       Assessment:   1. Atypical / Non cardiac chest pain - worse with inspiration. Lexiscan MPS 3/1/2021 negative for reversible ischemia. CTA reviewed with Dr Tristen Castellanos - spot calcium noted in LAD, otherwise no evidence of significant coronary calcium   2. Hypertension, no adequately controlled   3. Hyperlipidemia   4.  Obstructive sleep apnea   5. Chronic back pain  6. Previous tobacco abuse     Plan:   1. Add Imdur 60 mg daily   2. Add Toprol 5 mg daily   3. Add Protonix 40mg daily   4. Rest of cardiac medications the same   5. Consider cardiac cath given risk factors and family history if symptoms not resolved or worsen   6. Outpatient Echocardiogram   7. Cardiology will sign off, please call if needed     Above discussed with Dr Carlo Alarcon   Electronically signed by Flor Saravia on 3/3/2021 at 8:12 AM     Reason for consult: Chest pain. Patient seen with Flor Saravia . Agree with the findings and A/P. Management plan was discussed. I have personally interviewed the patient, independently performed a focused cardiac exam, reviewed the pertinent laboratory and diagnostic testing results and directly participated in the medical decision-making. HPI: 77-year-old morbidly obese ex-smoker male who is seen in consultation due to chest pain. He has history of hypertension, hyperlipidemia, obstructive sleep apnea treated with CPAP at night, chronic back pain and positive for history for CAD. His father had CABG in his late 45s or early 46s. Patient has been complaining of on and off chest discomfort over the past few weeks. His discomfort occurs at rest but not with activity. He describes his discomfort as left-sided and rib pain worse with inspiration. Patient states that he has been under a lot of emotional stress recently. His blood pressure was elevated on admission at 176/74. Reviewed the PMH, social history, FH and ROS from APRN note. Agree with the findings. See the full consult note for details. PE:   /79   Pulse 70   Temp 96.9 °F (36.1 °C) (Temporal)   Resp 18   Ht 5' 10\" (1.778 m)   Wt 280 lb (127 kg)   SpO2 98%   BMI 40.18 kg/m²   CONST: Middle-age morbidly obese male who appears of stated age. Awake, alert, cooperative, no apparent distress. HEENT: Head- normocephalic, atraumatic.   Neck: no jugular venous distention. No carotid bruit noted. CHEST: non-tender to palpation. LUNGS: Clear. CARDIOVASCULAR:  RRR, no murmur, s3, s4 or rub noted. PV: No lower extremity edema. Pedal pulses palpable, no clubbing or cyanosis   ABDOMEN: Soft, non-tender to light palpation. Bowel sounds present. No palpable masses no hepatosplenomegaly or splenomegaly; no abdominal bruit / pulsation  SKIN: Warm and dry   NEURO / PSYCH: Oriented to person, place and time. Speech clear and appropriate. Follows all commands. He is anxious. .     EKG as per my interpretation: Sinus rhythm at 76 bpm, low voltage in frontal leads, incomplete right bundle branch block with early transition. CXR:  No CHF, infiltrate or  pleural effusion. CTA of the chest:  No evidence of pulmonary embolism or acute pulmonary abnormality. Enlarged mediastinal lymph node.  Significance uncertain.  Could be reactive   Fatty infiltration of the liver         Exercise:  The patient exercised using a Yariel protocol,   completing 7:01 minutes and reaching an estimated work load of   26.2 metabolic equivalents (METS). Resting HR was 80. Peak   exercise heart rate was 154 ( 96% of maximum predicted heart rate   for age). Baseline /80. Peak exercise /70. The blood pressure response to exercise was normal       Exercise was terminated due to heart rate attained. The patient experienced chest tightnes and mild shortness of   breath with exercise, which improved with rest.        Pulse oximetry was used to monitor oxygen saturation during the   stress test.  The study was performed on Room Air.  The resting   pulse oximeter was 98%.  The post stress O2 saturation seen   during exercise was 97 %.           Exercise ECG:   The patient demonstrated no arrhythmias during exercise. With exercise, there were no ST segment changes of significance   at the heart rate achieved.      IMAGING: Myocardial perfusion imaging was performed at rest 30-35   minutes following the intravenous injection of 11.2 mCi of   (Tc-tetrofosmin) followed by 10 ml of Normal Saline.  At peak   exercise, the patient was injected intravenously with 30.9 mCi of   (Tc-tetrofosmin) followed by 10 ml of Normal Saline.  Gated   post-stress tomographic imaging was performed 20-25 minutes after   stress. FINDINGS: The overall quality of the study was good. Left ventricular cavity size was noted to be mildly enlarged on   both rest and stress studies. Rotational analog analysis demonstrated abnormal patient motion. A mild defect was present in the inferior wall(s) that was  small   size on the post stress images. The resting images  show no change. Gated SPECT left ventricular ejection fraction was calculated to   be 71%, with normal myocardial thickening and wall motion. Impression:     1. Exercise EKG was normal.   2. The patient experienced chest tightness with exercise not   typical of angina. 3. The myocardial perfusion imaging was normal with attenuation   artifact.     4. Overall left ventricular systolic function was normal without   regional wall motion abnormalities.     5. Exercise capacity was average. 6.   Low risk general exercise nuclear stress test.     Thank you for sending your patient to this [facility]. Electronically signed by Danay Rudolph MD on 3/1/2021 at 4:44 PM     Lab Review         Recent Labs     03/02/21  0916   WBC 6.1   HGB 15.6   HCT 46.1          Recent Labs     03/02/21  0916      K 4.2      CO2 27   BUN 20   CREATININE 1.1       Recent Labs     03/02/21  0916   AST 39   ALT 51*   ALKPHOS 51         Last 3 Troponin:    Lab Results   Component Value Date    TROPONINI <0.01 03/02/2021    TROPONINI <0.01 03/02/2021    TROPONINI <0.01 03/02/2021        Recent Labs     03/02/21  0916   INR 1.1           Assessment:  1.  Atypical / Non cardiac chest pain - worse with inspiration. Stress test with MPS 3/1/2021 negative for reversible ischemia. CTA reviewed revealed spot calcium noted in LAD, otherwise no evidence of significant coronary calcium   2. Hypertension: Currently controlled   3. Hyperlipidemia. 4. Obstructive sleep apnea   5. Chronic back pain  6. Previous tobacco abuse       Plan:  1. Add Imdur 60 mg daily   2. Add Toprol 25 mg daily   3. Add Protonix 40mg daily   4. Rest of cardiac medications the same   5. Consider cardiac cath given risk factors and family history if symptoms not resolved or worsen   6. Outpatient Echocardiogram   7. Cardiology will sign off, please call if needed         Thank you for the consult. Will sign off. Electronically signed by Ankit Vides MD on 3/3/2021 at 4:09 PM  Keila Anderson.

## 2021-03-03 NOTE — H&P
[facility]  History and Physical      CHIEF COMPLAINT: Chest pain    Reason for Admission: Chest pain    History Obtained From: Patient    PCP :  Steven Stroud MD    [address] / [place] [address]      HISTORY OF PRESENT ILLNESS:      The patient is a 61 y.o. male presented to the emergency room because of chest pain. He just had a stress test a day prior. This was a low risk study. [place] when this developed. Patient was then admitted at the request of cardiology for further evaluation treatment. Past Medical History:        Diagnosis Date    BMI 40.0-44.9, adult ([facility] [address] 2017    Chronic back pain     TIM (generalized anxiety disorder)     GERD (gastroesophageal reflux disease)     Hyperlipidemia     Hypertension     Sleep apnea      Past Surgical History:        Procedure Laterality Date    APPENDECTOMY      CARDIOVASCULAR STRESS TEST  2021    COLONOSCOPY           Medications Prior to Admission:    No medications prior to admission.     Allergies:  Shrimp flavor    Social History:   Social History     Socioeconomic History    Marital status:      Spouse name: Not on file    Number of children: 0    Years of education: Not on file    Highest education level: Not on file   Occupational History    Occupation: / owner    Social Needs    Financial resource strain: Not on file    Food insecurity     Worry: Not on file     Inability: Not on file   [employer] needs     Medical: Not on file     Non-medical: Not on file   Tobacco Use    Smoking status: Former Smoker     Packs/day: 3.00     Years: 20.00     Pack years: 60.00     Types: Cigars     Quit date: 10/3/1994     Years since quittin.4    Smokeless tobacco: Never Used    Tobacco comment: smokes 2 cigars a month and quit cigs 23 years ago   Substance and Sexual Activity    Alcohol use: Yes     Comment: social     Drug use: No    Sexual activity: Yes     Partners: Female   Lifestyle    Physical activity     Days per week: Not on file     Minutes per session: Not on file    Stress: Not on file   Relationships    Social connections     Talks on phone: Not on file     Gets together: Not on file     Attends [employer] service: Not on file     Active member of club or organization: Not on file     Attends meetings of clubs or organizations: Not on file     Relationship status: Not on file    Intimate partner violence     Fear of current or ex partner: Not on file     Emotionally abused: Not on file     Physically abused: Not on file     Forced sexual activity: Not on file   Other Topics Concern    Not on file   Social History Narrative    Not on file         Family History:       Problem Relation Age of Onset    Other Mother         dementia    Heart Disease Mother     Heart Disease Father     No Known Problems Sister     No Known Problems Brother     No Known Problems Sister     No Known Problems Sister        REVIEW OF SYSTEMS:    General ROS: negative  Hematological and Lymphatic ROS: negative  Endocrine ROS: negative  Respiratory ROS: no cough,  wheezing  or shortness of breath,   Cardiovascular ROS: Positive for chest pain  Gastrointestinal ROS: no abdominal pain, change in bowel habits, or black or bloody stools  Genito-Urinary ROS: no dysuria, trouble voiding, or hematuria  Neurological ROS: no TIA or stroke symptoms  negative    Vitals:  /79   Pulse 70   Temp 96.9 °F (36.1 °C) (Temporal)   Resp 18   Ht 5' 10\" (1.778 m)   Wt 280 lb (127 kg)   SpO2 98%   BMI 40.18 kg/m²     PHYSICAL EXAM:  General:  Awake, alert, oriented X 3. Well developed, well nourished, well groomed. No apparent distress. HEENT:  Normocephalic, atraumatic. Pupils equal, round, reactive to light. No scleral icterus. No conjunctival injection.    Neck:  Supple, no carotid bruits  Heart:  RRR,   Lungs:  CTA bilaterally, bilat symmetrical expansion, no wheeze, rales, or rhonchi  Abdomen: Bowel sounds present, soft, nontender, no masses, no organomegaly, no peritoneal signs  Extremities:  No clubbing, cyanosis, or edema  Skin:  Warm and dry, no open lesions or rash  Neuro:  Cranial nerves 2-12 intact, no focal deficits      DATA:     Recent Results (from the past 24 hour(s))   Troponin    Collection Time: 03/02/21  4:37 PM   Result Value Ref Range    Troponin <0.01 0.00 - 0.03 ng/mL   Troponin    Collection Time: 03/02/21  9:49 PM   Result Value Ref Range    Troponin <0.01 0.00 - 0.03 ng/mL       XR CHEST PORTABLE   Final Result      CT CHEST PULMONARY EMBOLISM W CONTRAST   Final Result   No evidence of pulmonary embolism or acute pulmonary abnormality. Enlarged mediastinal lymph node. Significance uncertain. Could be reactive   Fatty infiltration of the liver              ASSESSMENT :      Active Problems:    Chest pain  Resolved Problems:    * No resolved hospital problems. *    Obesity  Anxiety    Plan : Symptoms are atypical  Discharge home if with okay with cardiology  May need heart cath if symptoms are recurrent      Electronically signed by Steven Stroud MD on 3/3/2021 at 3:51 PM    NOTE: This report was transcribed using voice recognition software.  Every effort was made to ensure accuracy; however, inadvertent transcription errors may be present

## 2021-03-03 NOTE — CARE COORDINATION
3/3/21 Transition of Care: patient is alert and oriented. He is sitting on the side of the bed. He is observation status. He is here due to chest pain. He states he had a stress test as outpatient on 3/2 with no results. He is independent. He sees Dr Víctor Rodríguez and his pharmacy is [facility]. His plan is to return home at discharge with no needs.  Jaki Lacy RN CM

## 2021-03-08 ENCOUNTER — TELEPHONE (OUTPATIENT)
Dept: CARDIOLOGY CLINIC | Age: 61
End: 2021-03-08

## 2021-03-09 PROBLEM — E66.812 CLASS 2 SEVERE OBESITY DUE TO EXCESS CALORIES WITH SERIOUS COMORBIDITY AND BODY MASS INDEX (BMI) OF 39.0 TO 39.9 IN ADULT (HCC): Status: ACTIVE | Noted: 2020-11-23

## 2021-03-25 ENCOUNTER — OFFICE VISIT (OUTPATIENT)
Dept: CARDIOLOGY CLINIC | Age: 61
End: 2021-03-25
Payer: COMMERCIAL

## 2021-03-25 VITALS
HEART RATE: 69 BPM | SYSTOLIC BLOOD PRESSURE: 126 MMHG | HEIGHT: 70 IN | WEIGHT: 280.2 LBS | RESPIRATION RATE: 16 BRPM | BODY MASS INDEX: 40.11 KG/M2 | DIASTOLIC BLOOD PRESSURE: 80 MMHG

## 2021-03-25 DIAGNOSIS — I10 ESSENTIAL HYPERTENSION: Primary | ICD-10-CM

## 2021-03-25 PROCEDURE — 93000 ELECTROCARDIOGRAM COMPLETE: CPT | Performed by: INTERNAL MEDICINE

## 2021-03-25 PROCEDURE — 99214 OFFICE O/P EST MOD 30 MIN: CPT | Performed by: INTERNAL MEDICINE

## 2021-03-25 NOTE — PROGRESS NOTES
Sister    Father MI at 50  Mom valve replacement 62s      Social History:  Social History     Tobacco Use    Smoking status: Former Smoker     Packs/day: 3.00     Years: 20.00     Pack years: 60.00     Types: Cigars     Quit date: 10/3/1994     Years since quittin.4    Smokeless tobacco: Never Used    Tobacco comment: smokes 2 cigars a month and quit cigs 23 years ago   Substance Use Topics    Alcohol use: Yes     Comment: social     Drug use: No        Allergies:  No Known Allergies    Current Medications:    Current Outpatient Medications:     aspirin 81 MG EC tablet, Take 81 mg by mouth daily, Disp: , Rfl:     omeprazole (PRILOSEC) 20 MG delayed release capsule, Take 1 capsule by mouth daily, Disp: 90 capsule, Rfl: 3    rosuvastatin (CRESTOR) 20 MG tablet, Take 1 tablet by mouth daily, Disp: 90 tablet, Rfl: 3    telmisartan (MICARDIS) 80 MG tablet, Take 1 tablet by mouth daily, Disp: 90 tablet, Rfl: 3    fenofibrate (TRIGLIDE) 160 MG tablet, TAKE 1 TABLET DAILY, Disp: 90 tablet, Rfl: 3    Cholecalciferol (VITAMIN D3) 5000 units TABS, Take 5,000 Units by mouth daily, Disp: , Rfl:     Omega-3 Fatty Acids (FISH OIL) 1000 MG CAPS, Take 1,000 mg by mouth daily, Disp: , Rfl:     CPAP Machine MISC, Please provide patient with an auto CPAP with ranges 5-15 cm water pressure with C-Flex of 3 and heated humidification. Please also provide mask, tubing, and supplies to patient fit and comfort. Dx:BUTCH Orders faxed to [facility], Disp: 1 each, Rfl: 0    Physical Exam:  /80   Pulse 69   Resp 16   Ht 5' 10\" (1.778 m)   Wt 280 lb 3.2 oz (127.1 kg)   BMI 40.20 kg/m²   Wt Readings from Last 3 Encounters:   21 280 lb 3.2 oz (127.1 kg)   21 274 lb (124.3 kg)   21 280 lb (127 kg)     Appearance: Well-appearing, overweight male, awake, alert, no acute respiratory distress  Skin: Intact, no rash  Eyes: EOMI, no conjunctival erythema  ENMT: Moist mucous membranes.     Neck: Supple, no elevated JVP, Component Value Date    CHOLHDLRATIO 5.6 (A) 06/19/2018    CHOLHDLRATIO 5.4 12/18/2017     No results for input(s): PROBNP in the last 72 hours. Cardiac Tests:  ECG: Sinus rhythm 69 beats minute. Normal axis normal intervals. No ST or T wave changes. Echocardiogram:     CTA chest 3/2/2021  mpression   No evidence of pulmonary embolism or acute pulmonary abnormality. Enlarged mediastinal lymph node.  Significance uncertain.  Could be reactive   Fatty infiltration of the liver       Stress test:    Exercise treadmill stress test 3/1/2021  Impression:    1. Exercise EKG was normal.  2. The patient experienced chest tightness with exercise not typical of angina. 3. The myocardial perfusion imaging was normal with attenuation artifact. 4. Overall left ventricular systolic function was normal without regional wall motion abnormalities. 5. Exercise capacity was average. 6. Low risk general exercise nuclear stress test.    Exercise treadmill stress test 2014  9 minutes Yariel protocol, 97% MPHR, 10.1 METS  No ST changes, low risk study    Cardiac catheterization:     Orders Placed This Encounter   Procedures    EKG 12 lead        Requested Prescriptions      No prescriptions requested or ordered in this encounter        ASSESSMENT / PLAN:  1. Atypical chest pain, resolved. Multiple cardiac risk factors. Baseline EKG unremarkable. Exercise stress negative 3/2021  2. Hypertension, well controlled  3. Hyperlipidemia, on statin  4. Obstructive sleep apnea, compliant with CPAP  5. Obesity, BMI 40.2 kg/m²  6. GERD  7. Chronic back pain  8. Prior tobacco abuse    Recommendations:  Doing well from a cardiac standpoint, has had resolution of his chest pain which may have been stress related.     · Continue current cardiac medications  · Encouraged ongoing efforts for weight loss and increased physical activity  · Aggressive risk factor modification, as he remains at elevated cardiac risk  · He will notify me if any recurrent or exertional chest pain and we can reevaluate need for coronary angiogram  · Otherwise he would like to follow-up as needed    Thank you for allowing me to participate in your patient's care. Please feel free to contact me if you have any questions or concerns.     Leah Hedrick MD, [facility] Cardiology

## 2021-04-12 PROBLEM — R07.9 CHEST PAIN: Status: RESOLVED | Noted: 2021-03-02 | Resolved: 2021-04-12

## 2021-11-15 DIAGNOSIS — E78.2 MIXED HYPERLIPIDEMIA: ICD-10-CM

## 2021-11-15 LAB
ALBUMIN SERPL-MCNC: 4.8 G/DL (ref 3.5–5.2)
ALP BLD-CCNC: 58 U/L (ref 40–129)
ALT SERPL-CCNC: 41 U/L (ref 0–40)
ANION GAP SERPL CALCULATED.3IONS-SCNC: 12 MMOL/L (ref 7–16)
AST SERPL-CCNC: 33 U/L (ref 0–39)
BILIRUB SERPL-MCNC: 0.3 MG/DL (ref 0–1.2)
BUN BLDV-MCNC: 19 MG/DL (ref 6–23)
CALCIUM SERPL-MCNC: 10.2 MG/DL (ref 8.6–10.2)
CHLORIDE BLD-SCNC: 102 MMOL/L (ref 98–107)
CHOLESTEROL, TOTAL: 151 MG/DL (ref 0–199)
CO2: 26 MMOL/L (ref 22–29)
CREAT SERPL-MCNC: 1 MG/DL (ref 0.7–1.2)
GFR AFRICAN AMERICAN: >60
GFR NON-AFRICAN AMERICAN: >60 ML/MIN/1.73
GLUCOSE BLD-MCNC: 110 MG/DL (ref 74–99)
HDLC SERPL-MCNC: 44 MG/DL
LDL CHOLESTEROL CALCULATED: 96 MG/DL (ref 0–99)
POTASSIUM SERPL-SCNC: 5 MMOL/L (ref 3.5–5)
SODIUM BLD-SCNC: 140 MMOL/L (ref 132–146)
TOTAL PROTEIN: 7.2 G/DL (ref 6.4–8.3)
TRIGL SERPL-MCNC: 57 MG/DL (ref 0–149)
VLDLC SERPL CALC-MCNC: 11 MG/DL

## 2022-05-09 DIAGNOSIS — E78.1 HYPERTRIGLYCERIDEMIA: ICD-10-CM

## 2022-05-24 ENCOUNTER — HOSPITAL ENCOUNTER (OUTPATIENT)
Dept: CT IMAGING | Age: 62
Discharge: HOME | End: 2022-05-26
Payer: COMMERCIAL

## 2022-05-24 DIAGNOSIS — F17.210 SMOKING GREATER THAN 30 PACK YEARS: ICD-10-CM

## 2022-05-24 PROCEDURE — 71271 CT THORAX LUNG CANCER SCR C-: CPT

## 2022-06-23 ENCOUNTER — TELEPHONE (OUTPATIENT)
Dept: CASE MANAGEMENT | Age: 62
End: 2022-06-23

## 2022-06-23 NOTE — TELEPHONE ENCOUNTER
No call, encounter opened to process CT Lung Screening. CT Lung Screen: 5/24/2022    Impression   No pulmonary nodules       LUNG RADS:   Per ACR Lung-RADS Version 1.1       Lung rads 1.  Continue annual low-dose CT screening of the chest.       RECOMMENDATIONS:   If you would like to register your patient with the [facility], please contact the Nurse Navigator at   [phone]. Pack years: 61    Social History     Tobacco Use  Smoking Status: Former Smoker    Start Date:    Quit Date: 10/03/1994   Types: Cigarettes   Packs/Day: 3   Years: 20   Pack Years: 61   Smokeless Tobacco: Never used         Results letter sent to patient via my chart or mailed.      [facility]

## 2022-06-25 NOTE — ED PROVIDER NOTES
HPI:  3/2/21, Time: 8:52 AM JOSE Rosas is a 61 y.o. male presenting to the ED for chest and back pain, beginning 2 weeks ago. The complaint has been intermittent, severe in severity, and worsened by deep breath. Patient states he has been having intermittent chest pain for 2 weeks and had a stress test yesterday. He has not officially been notified of the results. He was on his way to the airport to fly to [place] and developed severe pain from the left mid back radiating to the anterior left chest.  He states it feels like a pulled muscle. Does not seem to be reproducible with palpation. States is worse when he takes a deep breath. Risk factors include hyperlipidemia hypertension sleep apnea elevated BMI.    ROS:   Pertinent positives and negatives are stated within HPI, all other systems reviewed and are negative.  --------------------------------------------- PAST HISTORY ---------------------------------------------  Past Medical History:  has a past medical history of BMI 40.0-44.9, adult ([facility] [address] Chronic back pain, TIM (generalized anxiety disorder), GERD (gastroesophageal reflux disease), Hyperlipidemia, Hypertension, and Sleep apnea. Past Surgical History:  has a past surgical history that includes Appendectomy; Colonoscopy; and cardiovascular stress test (03/01/2021). Social History:  reports that he quit smoking about 26 years ago. His smoking use included cigars. He has a 60.00 pack-year smoking history. He has never used smokeless tobacco. He reports current alcohol use. He reports that he does not use drugs. Family History: family history includes Heart Disease in his father and mother; No Known Problems in his brother, sister, sister, and sister; Other in his mother. The patients home medications have been reviewed.     Allergies: Shrimp flavor    ---------------------------------------------------PHYSICAL EXAM------------------------------------  Constitutional/General: Alert and oriented x3, well appearing, non toxic in NAD  Head: Normocephalic and atraumatic  Eyes: PERRL, EOMI  Mouth: Oropharynx clear, handling secretions, no trismus  Neck: Supple, full ROM, non tender to palpation in the midline, no stridor, no crepitus, no meningeal signs  Pulmonary: Lungs clear to auscultation bilaterally, no wheezes, rales, or rhonchi. Not in respiratory distress  Cardiovascular:  Regular rate. Regular rhythm. No murmurs, gallops, or rubs. 2+ distal pulses  Chest: no chest wall tenderness  Abdomen: Soft. Non tender. Non distended. +BS. No rebound, guarding, or rigidity. No pulsatile masses appreciated. Musculoskeletal: Moves all extremities x 4. Warm and well perfused, no clubbing, cyanosis, or edema. Capillary refill <3 seconds  Skin: warm and dry. No rashes. Neurologic: GCS 15, CN 2-12 grossly intact, no focal deficits, symmetric strength 5/5 in the upper and lower extremities bilaterally  Psych: Normal Affect    -------------------------------------------------- RESULTS -------------------------------------------------  I have personally reviewed all laboratory and imaging results for this patient. Results are listed below.      LABS:  Results for orders placed or performed during the hospital encounter of 03/02/21   CBC Auto Differential   Result Value Ref Range    WBC 6.1 4.5 - 11.5 E9/L    RBC 5.08 3.80 - 5.80 E12/L    Hemoglobin 15.6 12.5 - 16.5 g/dL    Hematocrit 46.1 37.0 - 54.0 %    MCV 90.7 80.0 - 99.9 fL    MCH 30.7 26.0 - 35.0 pg    MCHC 33.8 32.0 - 34.5 %    RDW 13.4 11.5 - 15.0 fL    Platelets 258 327 - 679 E9/L    MPV 10.4 7.0 - 12.0 fL    Neutrophils % 64.5 43.0 - 80.0 %    Immature Granulocytes % 0.3 0.0 - 5.0 %    Lymphocytes % 23.3 20.0 - 42.0 %    Monocytes % 8.8 2.0 - 12.0 %    Eosinophils % 2.8 0.0 - 6.0 %    Basophils % 0.3 0.0 - 2.0 %    Neutrophils Absolute 3.96 1.80 - 7.30 E9/L    Immature Granulocytes # 0.02 E9/L    Lymphocytes Absolute 1.43 (L) 1.50 - 4.00 E9/L    Monocytes Absolute 0.54 0.10 - 0.95 E9/L    Eosinophils Absolute 0.17 0.05 - 0.50 E9/L    Basophils Absolute 0.02 0.00 - 0.20 E9/L   Comprehensive Metabolic Panel   Result Value Ref Range    Sodium 139 132 - 146 mmol/L    Potassium 4.2 3.5 - 5.0 mmol/L    Chloride 102 98 - 107 mmol/L    CO2 27 22 - 29 mmol/L    Anion Gap 10 7 - 16 mmol/L    Glucose 129 (H) 74 - 99 mg/dL    BUN 20 8 - 23 mg/dL    CREATININE 1.1 0.7 - 1.2 mg/dL    GFR Non-African American >60 >=60 mL/min/1.73    GFR African American >60     Calcium 9.7 8.6 - 10.2 mg/dL    Total Protein 7.2 6.4 - 8.3 g/dL    Albumin 4.5 3.5 - 5.2 g/dL    Total Bilirubin 0.5 0.0 - 1.2 mg/dL    Alkaline Phosphatase 51 40 - 129 U/L    ALT 51 (H) 0 - 40 U/L    AST 39 0 - 39 U/L   APTT   Result Value Ref Range    aPTT 33.8 24.5 - 35.1 sec   Protime-INR   Result Value Ref Range    Protime 11.8 9.3 - 12.4 sec    INR 1.1    Troponin   Result Value Ref Range    Troponin <0.01 0.00 - 0.03 ng/mL   Troponin   Result Value Ref Range    Troponin <0.01 0.00 - 0.03 ng/mL   Troponin   Result Value Ref Range    Troponin <0.01 0.00 - 0.03 ng/mL   Troponin   Result Value Ref Range    Troponin <0.01 0.00 - 0.03 ng/mL   EKG 12 Lead   Result Value Ref Range    Ventricular Rate 76 BPM    Atrial Rate 76 BPM    P-R Interval 168 ms    QRS Duration 94 ms    Q-T Interval 382 ms    QTc Calculation (Bazett) 429 ms    P Axis 40 degrees    R Axis 14 degrees    T Axis 43 degrees       RADIOLOGY:  Interpreted by Radiologist.  XR CHEST PORTABLE   Final Result      CT CHEST PULMONARY EMBOLISM W CONTRAST   Final Result   No evidence of pulmonary embolism or acute pulmonary abnormality. Enlarged mediastinal lymph node. Significance uncertain.   Could be reactive   Fatty infiltration of the liver            EKG Interpretation  Interpreted by emergency department physician    Rhythm: Sinus rhythm with no ST segment elevation      ------------------------- NURSING NOTES AND VITALS REVIEWED ---------------------------   The nursing notes within the ED encounter and vital signs as below have been reviewed by myself. /79   Pulse 70   Temp 96.9 °F (36.1 °C) (Temporal)   Resp 18   Ht 5' 10\" (1.778 m)   Wt 280 lb (127 kg)   SpO2 98%   BMI 40.18 kg/m²   Oxygen Saturation Interpretation: Normal    The patients available past medical records and past encounters were reviewed. ------------------------------ ED COURSE/MEDICAL DECISION MAKING----------------------  Medications   morphine sulfate (PF) injection 4 mg (4 mg Intravenous Given 3/2/21 0909)   diphenhydrAMINE (BENADRYL) injection 25 mg (25 mg Intravenous Given 3/2/21 0907)   methylPREDNISolone sodium (SOLU-MEDROL) injection 125 mg (125 mg Intravenous Given 3/2/21 0910)   aspirin chewable tablet 324 mg (324 mg Oral Given 3/2/21 0917)   iopamidol (ISOVUE-370) 76 % injection 75 mL (75 mLs Intravenous Given 3/2/21 1035)   ketorolac (TORADOL) injection 15 mg (15 mg Intravenous Given 3/2/21 1139)             Medical Decision Making: Will check EKG, labs including troponin and chest x-ray. We will do CTA of the chest to rule out pulmonary embolus and to rule out aortic dissection and to rule out pneumonia. Re-Evaluations:             Re-evaluation. Patients symptoms are improving      Consultations:             Consults PCP, Dr. Anastacia Bah; he advises consult with cardiology. Phone consultation obtained with midlevel provider Neelam Aguirre who is working with Dr. Sandip Shukla from cardiology. Advised if patient still has chest pain to admit the patient. Reexamined patient at approximately 12:45 PM still complaining of #2 chest tightness. Patient relates that he had chest pain during the stress test yesterday.   We will admit the patient to intermediate monitor bed at [facility]          This patient's ED course included: a personal history and physicial examination, re-evaluation prior to disposition, multiple bedside re-evaluations, IV medications, cardiac monitoring, continuous pulse oximetry, complex medical decision making and emergency management and a personal history and physicial eaxmination    This patient has remained hemodynamically stable during their ED course. Counseling: The emergency provider has spoken with the patient and discussed todays results, in addition to providing specific details for the plan of care and counseling regarding the diagnosis and prognosis. Questions are answered at this time and they are agreeable with the plan.       --------------------------------- IMPRESSION AND DISPOSITION ---------------------------------    IMPRESSION  1. Chest pain, unspecified type        DISPOSITION  Disposition: Admit to telemetry  Patient condition is stable        NOTE: This report was transcribed using voice recognition software.  Every effort was made to ensure accuracy; however, inadvertent computerized transcription errors may be present          Gabriel Win MD  03/02/21 156 Patrick Condon MD  03/04/21 0548 You can access the FollowMyHealth Patient Portal offered by [facility] by registering at the following website: [email or web address]. By joining [employer]’s FollowMyHealth portal, you will also be able to view your health information using other applications (apps) compatible with our system.

## 2022-08-20 ENCOUNTER — HOSPITAL ENCOUNTER (INPATIENT)
Age: 62
LOS: 3 days | Discharge: HOME | DRG: 660 | End: 2022-08-23
Attending: STUDENT IN AN ORGANIZED HEALTH CARE EDUCATION/TRAINING PROGRAM | Admitting: INTERNAL MEDICINE
Payer: COMMERCIAL

## 2022-08-20 ENCOUNTER — APPOINTMENT (OUTPATIENT)
Dept: CT IMAGING | Age: 62
DRG: 660 | End: 2022-08-20
Payer: COMMERCIAL

## 2022-08-20 DIAGNOSIS — N30.01 ACUTE CYSTITIS WITH HEMATURIA: ICD-10-CM

## 2022-08-20 DIAGNOSIS — R52 PAIN: ICD-10-CM

## 2022-08-20 DIAGNOSIS — N20.1 URETEROLITHIASIS: Primary | ICD-10-CM

## 2022-08-20 DIAGNOSIS — N20.1 URETERAL STONE: ICD-10-CM

## 2022-08-20 DIAGNOSIS — N17.9 AKI (ACUTE KIDNEY INJURY) (HCC): ICD-10-CM

## 2022-08-20 LAB
ALBUMIN SERPL-MCNC: 5.1 G/DL (ref 3.5–5.2)
ALP BLD-CCNC: 66 U/L (ref 40–129)
ALT SERPL-CCNC: 51 U/L (ref 0–40)
AMORPHOUS: ABNORMAL
ANION GAP SERPL CALCULATED.3IONS-SCNC: 13 MMOL/L (ref 7–16)
AST SERPL-CCNC: 33 U/L (ref 0–39)
BACTERIA: ABNORMAL /HPF
BASOPHILS ABSOLUTE: 0.05 E9/L (ref 0–0.2)
BASOPHILS RELATIVE PERCENT: 0.4 % (ref 0–2)
BILIRUB SERPL-MCNC: 0.3 MG/DL (ref 0–1.2)
BILIRUBIN URINE: ABNORMAL
BLOOD, URINE: ABNORMAL
BUN BLDV-MCNC: 20 MG/DL (ref 6–23)
CALCIUM SERPL-MCNC: 10.7 MG/DL (ref 8.6–10.2)
CHLORIDE BLD-SCNC: 107 MMOL/L (ref 98–107)
CLARITY: ABNORMAL
CO2: 25 MMOL/L (ref 22–29)
COLOR: ABNORMAL
CREAT SERPL-MCNC: 1.3 MG/DL (ref 0.7–1.2)
EOSINOPHILS ABSOLUTE: 0.2 E9/L (ref 0.05–0.5)
EOSINOPHILS RELATIVE PERCENT: 1.6 % (ref 0–6)
GFR AFRICAN AMERICAN: >60
GFR NON-AFRICAN AMERICAN: 56 ML/MIN/1.73
GLUCOSE BLD-MCNC: 123 MG/DL (ref 74–99)
GLUCOSE URINE: 100 MG/DL
HCT VFR BLD CALC: 46.6 % (ref 37–54)
HEMOGLOBIN: 15.2 G/DL (ref 12.5–16.5)
IMMATURE GRANULOCYTES #: 0.08 E9/L
IMMATURE GRANULOCYTES %: 0.6 % (ref 0–5)
KETONES, URINE: 15 MG/DL
LACTIC ACID: 1.2 MMOL/L (ref 0.5–2.2)
LEUKOCYTE ESTERASE, URINE: ABNORMAL
LYMPHOCYTES ABSOLUTE: 1.48 E9/L (ref 1.5–4)
LYMPHOCYTES RELATIVE PERCENT: 11.9 % (ref 20–42)
MCH RBC QN AUTO: 29.6 PG (ref 26–35)
MCHC RBC AUTO-ENTMCNC: 32.6 % (ref 32–34.5)
MCV RBC AUTO: 90.8 FL (ref 80–99.9)
MONOCYTES ABSOLUTE: 0.88 E9/L (ref 0.1–0.95)
MONOCYTES RELATIVE PERCENT: 7.1 % (ref 2–12)
NEUTROPHILS ABSOLUTE: 9.78 E9/L (ref 1.8–7.3)
NEUTROPHILS RELATIVE PERCENT: 78.4 % (ref 43–80)
NITRITE, URINE: POSITIVE
PDW BLD-RTO: 13.2 FL (ref 11.5–15)
PH UA: 6.5 (ref 5–9)
PLATELET # BLD: 289 E9/L (ref 130–450)
PMV BLD AUTO: 10.6 FL (ref 7–12)
POTASSIUM SERPL-SCNC: 4.2 MMOL/L (ref 3.5–5)
PROTEIN UA: 100 MG/DL
RBC # BLD: 5.13 E12/L (ref 3.8–5.8)
RBC UA: >20 /HPF (ref 0–2)
SODIUM BLD-SCNC: 145 MMOL/L (ref 132–146)
SPECIFIC GRAVITY UA: >=1.03 (ref 1–1.03)
TOTAL PROTEIN: 8 G/DL (ref 6.4–8.3)
UROBILINOGEN, URINE: 1 E.U./DL
WBC # BLD: 12.5 E9/L (ref 4.5–11.5)
WBC UA: ABNORMAL /HPF (ref 0–5)

## 2022-08-20 PROCEDURE — 99285 EMERGENCY DEPT VISIT HI MDM: CPT

## 2022-08-20 PROCEDURE — 96361 HYDRATE IV INFUSION ADD-ON: CPT

## 2022-08-20 PROCEDURE — 83605 ASSAY OF LACTIC ACID: CPT

## 2022-08-20 PROCEDURE — 2580000003 HC RX 258: Performed by: INTERNAL MEDICINE

## 2022-08-20 PROCEDURE — 6360000002 HC RX W HCPCS: Performed by: INTERNAL MEDICINE

## 2022-08-20 PROCEDURE — 1200000000 HC SEMI PRIVATE

## 2022-08-20 PROCEDURE — 6360000002 HC RX W HCPCS: Performed by: STUDENT IN AN ORGANIZED HEALTH CARE EDUCATION/TRAINING PROGRAM

## 2022-08-20 PROCEDURE — 2580000003 HC RX 258: Performed by: STUDENT IN AN ORGANIZED HEALTH CARE EDUCATION/TRAINING PROGRAM

## 2022-08-20 PROCEDURE — 96374 THER/PROPH/DIAG INJ IV PUSH: CPT

## 2022-08-20 PROCEDURE — 81001 URINALYSIS AUTO W/SCOPE: CPT

## 2022-08-20 PROCEDURE — 87088 URINE BACTERIA CULTURE: CPT

## 2022-08-20 PROCEDURE — 36415 COLL VENOUS BLD VENIPUNCTURE: CPT

## 2022-08-20 PROCEDURE — 80053 COMPREHEN METABOLIC PANEL: CPT

## 2022-08-20 PROCEDURE — 85025 COMPLETE CBC W/AUTO DIFF WBC: CPT

## 2022-08-20 PROCEDURE — 6370000000 HC RX 637 (ALT 250 FOR IP): Performed by: PHYSICIAN ASSISTANT

## 2022-08-20 PROCEDURE — 96375 TX/PRO/DX INJ NEW DRUG ADDON: CPT

## 2022-08-20 PROCEDURE — 74176 CT ABD & PELVIS W/O CONTRAST: CPT

## 2022-08-20 PROCEDURE — 6370000000 HC RX 637 (ALT 250 FOR IP): Performed by: INTERNAL MEDICINE

## 2022-08-20 RX ORDER — LOSARTAN POTASSIUM 50 MG/1
100 TABLET ORAL DAILY
Status: DISCONTINUED | OUTPATIENT
Start: 2022-08-21 | End: 2022-08-23 | Stop reason: HOSPADM

## 2022-08-20 RX ORDER — ONDANSETRON 2 MG/ML
4 INJECTION INTRAMUSCULAR; INTRAVENOUS EVERY 6 HOURS PRN
Status: DISCONTINUED | OUTPATIENT
Start: 2022-08-20 | End: 2022-08-23 | Stop reason: HOSPADM

## 2022-08-20 RX ORDER — ACETAMINOPHEN 650 MG/1
650 SUPPOSITORY RECTAL EVERY 6 HOURS PRN
Status: DISCONTINUED | OUTPATIENT
Start: 2022-08-20 | End: 2022-08-23 | Stop reason: HOSPADM

## 2022-08-20 RX ORDER — OXYCODONE HYDROCHLORIDE AND ACETAMINOPHEN 5; 325 MG/1; MG/1
1 TABLET ORAL ONCE
Status: COMPLETED | OUTPATIENT
Start: 2022-08-20 | End: 2022-08-20

## 2022-08-20 RX ORDER — 0.9 % SODIUM CHLORIDE 0.9 %
1000 INTRAVENOUS SOLUTION INTRAVENOUS ONCE
Status: COMPLETED | OUTPATIENT
Start: 2022-08-20 | End: 2022-08-20

## 2022-08-20 RX ORDER — ENOXAPARIN SODIUM 100 MG/ML
30 INJECTION SUBCUTANEOUS 2 TIMES DAILY
Status: DISCONTINUED | OUTPATIENT
Start: 2022-08-20 | End: 2022-08-23 | Stop reason: HOSPADM

## 2022-08-20 RX ORDER — ONDANSETRON 2 MG/ML
4 INJECTION INTRAMUSCULAR; INTRAVENOUS ONCE
Status: COMPLETED | OUTPATIENT
Start: 2022-08-20 | End: 2022-08-20

## 2022-08-20 RX ORDER — SODIUM CHLORIDE 0.9 % (FLUSH) 0.9 %
5-40 SYRINGE (ML) INJECTION EVERY 12 HOURS SCHEDULED
Status: DISCONTINUED | OUTPATIENT
Start: 2022-08-20 | End: 2022-08-23 | Stop reason: HOSPADM

## 2022-08-20 RX ORDER — ONDANSETRON 4 MG/1
4 TABLET, ORALLY DISINTEGRATING ORAL ONCE
Status: COMPLETED | OUTPATIENT
Start: 2022-08-20 | End: 2022-08-20

## 2022-08-20 RX ORDER — SODIUM CHLORIDE 9 MG/ML
INJECTION, SOLUTION INTRAVENOUS PRN
Status: DISCONTINUED | OUTPATIENT
Start: 2022-08-20 | End: 2022-08-23 | Stop reason: HOSPADM

## 2022-08-20 RX ORDER — OXYCODONE HYDROCHLORIDE AND ACETAMINOPHEN 5; 325 MG/1; MG/1
1 TABLET ORAL EVERY 4 HOURS PRN
Status: DISCONTINUED | OUTPATIENT
Start: 2022-08-20 | End: 2022-08-23 | Stop reason: HOSPADM

## 2022-08-20 RX ORDER — ACETAMINOPHEN 325 MG/1
650 TABLET ORAL EVERY 6 HOURS PRN
Status: DISCONTINUED | OUTPATIENT
Start: 2022-08-20 | End: 2022-08-23 | Stop reason: HOSPADM

## 2022-08-20 RX ORDER — OMEPRAZOLE 20 MG/1
20 CAPSULE, DELAYED RELEASE ORAL DAILY
Status: DISCONTINUED | OUTPATIENT
Start: 2022-08-21 | End: 2022-08-20 | Stop reason: CLARIF

## 2022-08-20 RX ORDER — SODIUM CHLORIDE 0.9 % (FLUSH) 0.9 %
5-40 SYRINGE (ML) INJECTION PRN
Status: DISCONTINUED | OUTPATIENT
Start: 2022-08-20 | End: 2022-08-23 | Stop reason: HOSPADM

## 2022-08-20 RX ORDER — KETOROLAC TROMETHAMINE 30 MG/ML
15 INJECTION, SOLUTION INTRAMUSCULAR; INTRAVENOUS ONCE
Status: COMPLETED | OUTPATIENT
Start: 2022-08-20 | End: 2022-08-20

## 2022-08-20 RX ORDER — POLYETHYLENE GLYCOL 3350 17 G/17G
17 POWDER, FOR SOLUTION ORAL DAILY PRN
Status: DISCONTINUED | OUTPATIENT
Start: 2022-08-20 | End: 2022-08-23 | Stop reason: HOSPADM

## 2022-08-20 RX ORDER — ROSUVASTATIN CALCIUM 20 MG/1
20 TABLET, COATED ORAL DAILY
Status: DISCONTINUED | OUTPATIENT
Start: 2022-08-21 | End: 2022-08-23 | Stop reason: HOSPADM

## 2022-08-20 RX ORDER — PANTOPRAZOLE SODIUM 40 MG/1
40 TABLET, DELAYED RELEASE ORAL
Status: DISCONTINUED | OUTPATIENT
Start: 2022-08-21 | End: 2022-08-23 | Stop reason: HOSPADM

## 2022-08-20 RX ORDER — SODIUM CHLORIDE 9 MG/ML
INJECTION, SOLUTION INTRAVENOUS CONTINUOUS
Status: DISCONTINUED | OUTPATIENT
Start: 2022-08-20 | End: 2022-08-23 | Stop reason: HOSPADM

## 2022-08-20 RX ORDER — TELMISARTAN 80 MG/1
80 TABLET ORAL DAILY
Status: DISCONTINUED | OUTPATIENT
Start: 2022-08-21 | End: 2022-08-20 | Stop reason: CLARIF

## 2022-08-20 RX ORDER — ONDANSETRON 4 MG/1
4 TABLET, ORALLY DISINTEGRATING ORAL EVERY 8 HOURS PRN
Status: DISCONTINUED | OUTPATIENT
Start: 2022-08-20 | End: 2022-08-23 | Stop reason: HOSPADM

## 2022-08-20 RX ADMIN — ENOXAPARIN SODIUM 30 MG: 100 INJECTION SUBCUTANEOUS at 23:02

## 2022-08-20 RX ADMIN — SODIUM CHLORIDE 1000 ML: 9 INJECTION, SOLUTION INTRAVENOUS at 18:59

## 2022-08-20 RX ADMIN — SODIUM CHLORIDE 1000 ML: 9 INJECTION, SOLUTION INTRAVENOUS at 19:57

## 2022-08-20 RX ADMIN — OXYCODONE AND ACETAMINOPHEN 1 TABLET: 5; 325 TABLET ORAL at 17:56

## 2022-08-20 RX ADMIN — CEFTRIAXONE 1000 MG: 1 INJECTION, POWDER, FOR SOLUTION INTRAMUSCULAR; INTRAVENOUS at 21:25

## 2022-08-20 RX ADMIN — ONDANSETRON HYDROCHLORIDE 4 MG: 2 SOLUTION INTRAMUSCULAR; INTRAVENOUS at 18:58

## 2022-08-20 RX ADMIN — ONDANSETRON 4 MG: 4 TABLET, ORALLY DISINTEGRATING ORAL at 17:56

## 2022-08-20 RX ADMIN — KETOROLAC TROMETHAMINE 15 MG: 30 INJECTION, SOLUTION INTRAMUSCULAR at 18:57

## 2022-08-20 RX ADMIN — OXYCODONE AND ACETAMINOPHEN 1 TABLET: 5; 325 TABLET ORAL at 23:02

## 2022-08-20 RX ADMIN — SODIUM CHLORIDE: 9 INJECTION, SOLUTION INTRAVENOUS at 23:02

## 2022-08-20 ASSESSMENT — PAIN DESCRIPTION - ORIENTATION
ORIENTATION: RIGHT

## 2022-08-20 ASSESSMENT — PAIN DESCRIPTION - DESCRIPTORS
DESCRIPTORS: ACHING
DESCRIPTORS: TEARING
DESCRIPTORS: ACHING;TEARING

## 2022-08-20 ASSESSMENT — PAIN DESCRIPTION - PAIN TYPE
TYPE: ACUTE PAIN

## 2022-08-20 ASSESSMENT — PAIN SCALES - GENERAL
PAINLEVEL_OUTOF10: 8
PAINLEVEL_OUTOF10: 2
PAINLEVEL_OUTOF10: 3
PAINLEVEL_OUTOF10: 10

## 2022-08-20 ASSESSMENT — PAIN DESCRIPTION - FREQUENCY
FREQUENCY: CONTINUOUS
FREQUENCY: CONTINUOUS

## 2022-08-20 ASSESSMENT — PAIN DESCRIPTION - LOCATION
LOCATION: FLANK

## 2022-08-20 ASSESSMENT — PAIN - FUNCTIONAL ASSESSMENT: PAIN_FUNCTIONAL_ASSESSMENT: 0-10

## 2022-08-20 NOTE — ED PROVIDER NOTES
Department of Emergency Medicine   ED  Provider Note  Admit Date/RoomTime: 8/20/2022  6:33 PM  [facility]          History of Present Illness:  8/20/22, Time: 6:40 PM EDT  Chief Complaint   Patient presents with    Flank Pain     Patient states right sided flank/groin pain x one day. +n/v. +diaphoretic. +dark colored urine. Denies other symptoms. Chelly Greenberg is a 58 y.o. male presenting to the ED for flank pain, beginning just prior to arrival.  The complaint has been persistent, moderate in severity, and worsened by nothing. The patient is a 70-year-old male presents the emergency department complaining of right-sided flank pain. Patient symptoms are sudden onset several hours prior to arrival, persistent, moderate in severity, nothing makes it better or worse. No history of kidney stones. Patient describes pain as sharp over his right flank and nonradiating. He states he has never had pain like this in the past.  He states that he is nauseated and vomited multiple times prior to arrival.  Denies any fever, chills, history of kidney stones, headedness, dizziness, syncope, chest pain, shortness of breath, dysuria, hematuria, recent hospitalization, recent illness, or other acute symptoms or concerns. Review of Systems:   A complete review of systems was performed and pertinent positives and negatives are stated within HPI, all other systems reviewed and are negative.        --------------------------------------------- PAST HISTORY ---------------------------------------------  Past Medical History:  has a past medical history of BMI 40.0-44.9, adult ([address] Chronic back pain, TIM (generalized anxiety disorder), GERD (gastroesophageal reflux disease), Hyperlipidemia, Hypertension, and Sleep apnea. Past Surgical History:  has a past surgical history that includes Appendectomy; Colonoscopy; and cardiovascular stress test (03/01/2021).     Social History:  reports that he quit smoking about 27 years ago. His smoking use included cigars. He has a 60.00 pack-year smoking history. He has never used smokeless tobacco. He reports current alcohol use. He reports that he does not use drugs. Family History: family history includes Heart Disease in his father and mother; No Known Problems in his brother, sister, sister, and sister; Other in his mother. . Unless otherwise noted, family history is non contributory    The patients home medications have been reviewed. Allergies: Patient has no known allergies. I have reviewed the past medical history, past surgical history, social history, and family history    ---------------------------------------------------PHYSICAL EXAM--------------------------------------    Constitutional/General: Alert and oriented x3, patient is mildly distressed secondary to pain sitting up holding his right flank. Head: Normocephalic and atraumatic  Eyes:  EOMI, sclera non icteric  ENT: Oropharynx clear, handling secretions, no trismus, no asymmetry of the posterior oropharynx or uvular edema  Neck: Supple, full ROM, no stridor, no meningeal signs  Respiratory: Lungs clear to auscultation bilaterally, no wheezes, rales, or rhonchi. Not in respiratory distress  Cardiovascular:  Regular rate. Regular rhythm. No murmurs, no gallops, no rubs. 2+ distal pulses. Equal extremity pulses. Gastrointestinal: Moderate tenderness over the right CVA. Nonperitoneal abdomen. Musculoskeletal: Moves all extremities x 4. Warm and well perfused, no clubbing, no cyanosis, no edema. Capillary refill <3 seconds  Skin: skin warm and dry. No rashes. Neurologic: GCS 15, no focal deficits, symmetric strength 5/5 in the upper and lower extremities bilaterally  Psychiatric: Normal Affect    -------------------------------------------------- RESULTS -------------------------------------------------  I have personally reviewed all laboratory and imaging results for this patient.  Results are listed below.      LABS: (Lab results interpreted by me)  Results for orders placed or performed during the hospital encounter of 08/20/22   CBC with Auto Differential   Result Value Ref Range    WBC 12.5 (H) 4.5 - 11.5 E9/L    RBC 5.13 3.80 - 5.80 E12/L    Hemoglobin 15.2 12.5 - 16.5 g/dL    Hematocrit 46.6 37.0 - 54.0 %    MCV 90.8 80.0 - 99.9 fL    MCH 29.6 26.0 - 35.0 pg    MCHC 32.6 32.0 - 34.5 %    RDW 13.2 11.5 - 15.0 fL    Platelets 102 602 - 863 E9/L    MPV 10.6 7.0 - 12.0 fL    Neutrophils % 78.4 43.0 - 80.0 %    Immature Granulocytes % 0.6 0.0 - 5.0 %    Lymphocytes % 11.9 (L) 20.0 - 42.0 %    Monocytes % 7.1 2.0 - 12.0 %    Eosinophils % 1.6 0.0 - 6.0 %    Basophils % 0.4 0.0 - 2.0 %    Neutrophils Absolute 9.78 (H) 1.80 - 7.30 E9/L    Immature Granulocytes # 0.08 E9/L    Lymphocytes Absolute 1.48 (L) 1.50 - 4.00 E9/L    Monocytes Absolute 0.88 0.10 - 0.95 E9/L    Eosinophils Absolute 0.20 0.05 - 0.50 E9/L    Basophils Absolute 0.05 0.00 - 0.20 E9/L   Urinalysis with Microscopic   Result Value Ref Range    Color, UA NANETTE (A) Straw/Yellow    Clarity, UA TURBID (A) Clear    Glucose, Ur 100 (A) Negative mg/dL    Bilirubin Urine MODERATE (A) Negative    Ketones, Urine 15 (A) Negative mg/dL    Specific Gravity, UA >=1.030 1.005 - 1.030    Blood, Urine LARGE (A) Negative    pH, UA 6.5 5.0 - 9.0    Protein,  (A) Negative mg/dL    Urobilinogen, Urine 1.0 <2.0 E.U./dL    Nitrite, Urine POSITIVE (A) Negative    Leukocyte Esterase, Urine TRACE (A) Negative    WBC, UA 2-5 0 - 5 /HPF    RBC, UA >20 0 - 2 /HPF    Bacteria, UA MODERATE (A) None Seen /HPF    Amorphous, UA MANY    Comprehensive Metabolic Panel   Result Value Ref Range    Sodium 145 132 - 146 mmol/L    Potassium 4.2 3.5 - 5.0 mmol/L    Chloride 107 98 - 107 mmol/L    CO2 25 22 - 29 mmol/L    Anion Gap 13 7 - 16 mmol/L    Glucose 123 (H) 74 - 99 mg/dL    BUN 20 6 - 23 mg/dL    Creatinine 1.3 (H) 0.7 - 1.2 mg/dL    GFR Non-African American 56 >=60 mL/min/1.73    GFR African American >60     Calcium 10.7 (H) 8.6 - 10.2 mg/dL    Total Protein 8.0 6.4 - 8.3 g/dL    Albumin 5.1 3.5 - 5.2 g/dL    Total Bilirubin 0.3 0.0 - 1.2 mg/dL    Alkaline Phosphatase 66 40 - 129 U/L    ALT 51 (H) 0 - 40 U/L    AST 33 0 - 39 U/L   Lactic Acid   Result Value Ref Range    Lactic Acid 1.2 0.5 - 2.2 mmol/L   ,       RADIOLOGY:  Interpreted by Radiologist unless otherwise specified  CT ABDOMEN PELVIS WO CONTRAST Additional Contrast? None   Final Result   1. Right obstructive uropathy related to a 6 mm stone in the right mid   ureter. Left nephrolithiasis but no evidence of left obstructive uropathy. 2. Extensive lymphadenopathy worse in the right lower iliac region with a   taye mass measuring 5.2 x 3.3 cm. This was not previously seen. There is   also retroperitoneal lymphadenopathy and high right iliac lymphadenopathy. There is suspicion for possible lymphoma. 3. Fatty liver but no focal disease. RECOMMENDATIONS:   Workup for possible lymphoma with tissue biopsy of the area of   lymphadenopathy.                 ------------------------- NURSING NOTES AND VITALS REVIEWED ---------------------------   The nursing notes within the ED encounter and vital signs as below have been reviewed by myself  BP (!) 144/94   Pulse 72   Temp 97.7 °F (36.5 °C)   Resp 16   Ht 5' 10\" (1.778 m)   Wt 270 lb (122.5 kg)   SpO2 95%   BMI 38.74 kg/m²     Oxygen Saturation Interpretation: Normal    The patients available past medical records and past encounters were reviewed.         ------------------------------ ED COURSE/MEDICAL DECISION MAKING----------------------  Medications   cefTRIAXone (ROCEPHIN) 1,000 mg in sterile water 10 mL IV syringe (has no administration in time range)   oxyCODONE-acetaminophen (PERCOCET) 5-325 MG per tablet 1 tablet (1 tablet Oral Given 8/20/22 1756)   ondansetron (ZOFRAN-ODT) disintegrating tablet 4 mg (4 mg Oral Given 8/20/22 1756)   0.9 % sodium chloride bolus (0 mLs IntraVENous Stopped 8/20/22 2106)   ketorolac (TORADOL) injection 15 mg (15 mg IntraVENous Given 8/20/22 1857)   ondansetron (ZOFRAN) injection 4 mg (4 mg IntraVENous Given 8/20/22 1858)   0.9 % sodium chloride bolus (1,000 mLs IntraVENous New Bag 8/20/22 1957)   cefTRIAXone (ROCEPHIN) 1,000 mg in sterile water 10 mL IV syringe (1,000 mg IntraVENous Given 8/20/22 2125)           The cardiac monitor revealed NSR with a heart rate in the 70s as interpreted by me. The cardiac monitor was ordered secondary to the patient's pain and to monitor the patient for dysrhythmia. CPT [id number]       I, Dr. Marleny Thomas, am the primary provider of record    Medical Decision Making:   The patient is a 80-year-old male presents emerged department complaining of flank pain. The patient is hemodynamically stable, nontoxic, in mild distress secondary to pain. Patient found to have acute urinary tract infection along with slightly elevated renal function with a creatinine of 1.3.  6 mm stone on the right. Also has enlarged lymph nodes on the right side, concerning for possible lymphoma. Did discuss these findings with the patient. He was given Rocephin down here in the emergency department after urine culture was obtained. No lactic acidosis. Afebrile and no signs of sepsis. Consulted with hospitalist who accepted for admission for urology consultation. Oxygen Saturation Interpretation: 95 % on room air. Re-Evaluations:  ED Course as of 08/20/22 2142   Sat Aug 20, 2022   2119 Consulted with Dr. Ramandeep Acuña \[facility] [place] [facility]\"", who accepted for admission. [KG]      ED Course User Index  [KG] Nano Swanson DO       Patient felt better on reassessment.        This patient's ED course included: a personal history and physicial examination, re-evaluation prior to disposition, multiple bedside re-evaluations, IV medications, cardiac monitoring, continuous pulse oximetry, and complex medical decision making and

## 2022-08-20 NOTE — ED NOTES
Department of Emergency Medicine  FIRST PROVIDER TRIAGE NOTE             Independent MLP           8/20/22  5:53 PM EDT    Date of Encounter: 8/20/22   MRN: [id number]      HPI: Prashant Macdonald is a 58 y.o. male who presents to the ED for Flank Pain (Patient states right sided flank/groin pain x one day. +n/v. +diaphoretic. +dark colored urine. Denies other symptoms. )     ROS: Negative for cp or fever. PE: Gen Appearance/Constitutional: alert     Initial Plan of Care: All treatment areas with department are currently occupied. Plan to order/Initiate the following while awaiting opening in ED: labs, EKG, and imaging studies.   Initiate Treatment-Testing, Proceed toTreatment Area When Bed Available for ED Attending/MLP to Continue Care    Electronically signed by Reanna Frey PA-C   DD: 8/20/22       Reanna Frey PA-C  08/20/22 9662

## 2022-08-21 LAB
ANION GAP SERPL CALCULATED.3IONS-SCNC: 14 MMOL/L (ref 7–16)
BUN BLDV-MCNC: 23 MG/DL (ref 6–23)
CALCIUM SERPL-MCNC: 9.9 MG/DL (ref 8.6–10.2)
CHLORIDE BLD-SCNC: 105 MMOL/L (ref 98–107)
CO2: 22 MMOL/L (ref 22–29)
CREAT SERPL-MCNC: 1.3 MG/DL (ref 0.7–1.2)
GFR AFRICAN AMERICAN: >60
GFR NON-AFRICAN AMERICAN: 56 ML/MIN/1.73
GLUCOSE BLD-MCNC: 140 MG/DL (ref 74–99)
HCT VFR BLD CALC: 44.1 % (ref 37–54)
HEMOGLOBIN: 14.7 G/DL (ref 12.5–16.5)
LACTATE DEHYDROGENASE: 350 U/L (ref 135–225)
MCH RBC QN AUTO: 29.5 PG (ref 26–35)
MCHC RBC AUTO-ENTMCNC: 33.3 % (ref 32–34.5)
MCV RBC AUTO: 88.6 FL (ref 80–99.9)
PDW BLD-RTO: 13.4 FL (ref 11.5–15)
PLATELET # BLD: 306 E9/L (ref 130–450)
PMV BLD AUTO: 10.4 FL (ref 7–12)
POTASSIUM SERPL-SCNC: 4.2 MMOL/L (ref 3.5–5)
RBC # BLD: 4.98 E12/L (ref 3.8–5.8)
SODIUM BLD-SCNC: 141 MMOL/L (ref 132–146)
WBC # BLD: 15.5 E9/L (ref 4.5–11.5)

## 2022-08-21 PROCEDURE — 6370000000 HC RX 637 (ALT 250 FOR IP): Performed by: INTERNAL MEDICINE

## 2022-08-21 PROCEDURE — 36415 COLL VENOUS BLD VENIPUNCTURE: CPT

## 2022-08-21 PROCEDURE — 80048 BASIC METABOLIC PNL TOTAL CA: CPT

## 2022-08-21 PROCEDURE — 85027 COMPLETE CBC AUTOMATED: CPT

## 2022-08-21 PROCEDURE — 1200000000 HC SEMI PRIVATE

## 2022-08-21 PROCEDURE — 83615 LACTATE (LD) (LDH) ENZYME: CPT

## 2022-08-21 PROCEDURE — 6360000002 HC RX W HCPCS: Performed by: INTERNAL MEDICINE

## 2022-08-21 PROCEDURE — 2580000003 HC RX 258: Performed by: INTERNAL MEDICINE

## 2022-08-21 RX ORDER — CIPROFLOXACIN 2 MG/ML
400 INJECTION, SOLUTION INTRAVENOUS SEE ADMIN INSTRUCTIONS
Status: COMPLETED | OUTPATIENT
Start: 2022-08-21 | End: 2022-08-22

## 2022-08-21 RX ORDER — MORPHINE SULFATE 2 MG/ML
2 INJECTION, SOLUTION INTRAMUSCULAR; INTRAVENOUS EVERY 4 HOURS PRN
Status: DISCONTINUED | OUTPATIENT
Start: 2022-08-21 | End: 2022-08-23 | Stop reason: HOSPADM

## 2022-08-21 RX ORDER — SODIUM CHLORIDE 9 MG/ML
INJECTION, SOLUTION INTRAVENOUS PRN
Status: DISCONTINUED | OUTPATIENT
Start: 2022-08-21 | End: 2022-08-22 | Stop reason: HOSPADM

## 2022-08-21 RX ORDER — SODIUM CHLORIDE 0.9 % (FLUSH) 0.9 %
5-40 SYRINGE (ML) INJECTION EVERY 12 HOURS SCHEDULED
Status: DISCONTINUED | OUTPATIENT
Start: 2022-08-21 | End: 2022-08-22 | Stop reason: HOSPADM

## 2022-08-21 RX ORDER — SODIUM CHLORIDE 0.9 % (FLUSH) 0.9 %
5-40 SYRINGE (ML) INJECTION PRN
Status: DISCONTINUED | OUTPATIENT
Start: 2022-08-21 | End: 2022-08-22 | Stop reason: HOSPADM

## 2022-08-21 RX ADMIN — PANTOPRAZOLE SODIUM 40 MG: 40 TABLET, DELAYED RELEASE ORAL at 05:01

## 2022-08-21 RX ADMIN — MORPHINE SULFATE 2 MG: 2 INJECTION, SOLUTION INTRAMUSCULAR; INTRAVENOUS at 05:01

## 2022-08-21 RX ADMIN — ENOXAPARIN SODIUM 30 MG: 100 INJECTION SUBCUTANEOUS at 07:43

## 2022-08-21 RX ADMIN — OXYCODONE AND ACETAMINOPHEN 1 TABLET: 5; 325 TABLET ORAL at 03:17

## 2022-08-21 RX ADMIN — CEFTRIAXONE 1000 MG: 1 INJECTION, POWDER, FOR SOLUTION INTRAMUSCULAR; INTRAVENOUS at 20:22

## 2022-08-21 RX ADMIN — ENOXAPARIN SODIUM 30 MG: 100 INJECTION SUBCUTANEOUS at 20:24

## 2022-08-21 RX ADMIN — OXYCODONE AND ACETAMINOPHEN 1 TABLET: 5; 325 TABLET ORAL at 07:44

## 2022-08-21 RX ADMIN — ACETAMINOPHEN 650 MG: 325 TABLET ORAL at 20:21

## 2022-08-21 RX ADMIN — MORPHINE SULFATE 2 MG: 2 INJECTION, SOLUTION INTRAMUSCULAR; INTRAVENOUS at 00:48

## 2022-08-21 RX ADMIN — LOSARTAN POTASSIUM 100 MG: 50 TABLET, FILM COATED ORAL at 07:43

## 2022-08-21 RX ADMIN — ROSUVASTATIN 20 MG: 20 TABLET, FILM COATED ORAL at 07:43

## 2022-08-21 RX ADMIN — ONDANSETRON HYDROCHLORIDE 4 MG: 2 SOLUTION INTRAMUSCULAR; INTRAVENOUS at 03:17

## 2022-08-21 ASSESSMENT — PAIN SCALES - GENERAL
PAINLEVEL_OUTOF10: 10
PAINLEVEL_OUTOF10: 3
PAINLEVEL_OUTOF10: 10

## 2022-08-21 ASSESSMENT — PAIN DESCRIPTION - LOCATION: LOCATION: FLANK

## 2022-08-21 ASSESSMENT — PAIN DESCRIPTION - ORIENTATION: ORIENTATION: RIGHT

## 2022-08-21 ASSESSMENT — PAIN DESCRIPTION - FREQUENCY: FREQUENCY: INTERMITTENT

## 2022-08-21 ASSESSMENT — PAIN DESCRIPTION - DESCRIPTORS: DESCRIPTORS: ACHING

## 2022-08-21 ASSESSMENT — PAIN DESCRIPTION - PAIN TYPE: TYPE: ACUTE PAIN

## 2022-08-21 NOTE — ED NOTES
R AC IV site reassessed and dressed for complaints of pain. After redressing and flushing, the patient has no complaints of pain at the site.       Nick Chang  08/20/22 1284

## 2022-08-21 NOTE — H&P
[address]                  [address] [facility]  [facility]                              HISTORY AND PHYSICAL    PATIENT NAME: Jaida Richards                      :        1960  MED REC NO:   [id number]                            ROOM:       [id number]  ACCOUNT NO:   [de-identified]                           ADMIT DATE: 2022  PROVIDER:     Travis Klein DO    CHIEF COMPLAINT:  Right flank pain. HISTORY OF PRESENT ILLNESS:  The patient is a 66-year-old  male  who presented to the emergency room complaining of acute onset of right  flank pain about 05:00 p.m. with associated nausea, vomiting and chills  and dark urine. Diagnostic evaluation in emergency room revealed a  right ureteral stone and a significant intra-abdominal lymphadenopathy. He was admitted for further evaluation and treatment. MEDICATIONS PRIOR TO ADMISSION:  Triglide, Micardis, Prilosec, Crestor,  aspirin, vitamin D, fish oil, CPAP. PAST MEDICAL HISTORY:  Hypertension, elevated triglycerides, elevated  cholesterol, GERD, obstructive sleep apnea. PAST SURGICAL HISTORY:  Appendectomy. SOCIAL HISTORY:  The patient quit tobacco.  Occasional alcohol. REVIEW OF SYSTEMS:  Remarkable for above-stated chief complaint plus  allergies none known. PRIMARY CARE PHYSICIAN:  Rosio Mccarthy MD    PHYSICAL EXAMINATION:  GENERAL APPEARANCE:  Physical exam reveals a 66-year-old  male  who is alert and oriented x3, cooperative and a good historian. VITAL SIGNS:  On admission temperature 97.7, pulse 58, respirations 16,  blood pressure 203/100. HEENT:  Head:  Normocephalic, atraumatic. Eyes:  Pupils equal and  reactive to light. Extraocular muscles intact. Fundi not well  visualized. Nose:  No obstruction, polyp or discharge noted. Mouth:   Mucosa without lesion. Pharynx noninjected without exudate. NECK:  Supple. No JVD. No thyromegaly.   No carotid bruits. HEART:  Regular rate and rhythm without murmur. LUNGS:  Clear to auscultation bilaterally. ABDOMEN:  Positive bowel sounds, soft, nontender. No rebound or  guarding. No hepatosplenomegaly. No masses. BACK:  There is tenderness to palpation in the right flank. EXTREMITIES:  There is a palpable mass in the right distal anterior  thigh. LYMPH NODES:  No adenopathy. SKIN:  Without rash or lesion. IMPRESSION:  Right ureteral stone, lymphadenopathy,  hypertriglyceridemia, hypercholesterolemia, hypertension, obesity,  obstructive sleep apnea on CPAP. PLAN:  Admit. Pain control. IV fluids. Urology to see. We will ask  Oncology to see regarding lymphadenopathy. Discharge plan, home when  stable.         Fabián Wyman DO    D: 08/21/2022 9:55:04       T: 08/21/2022 9:57:30     [email or web address]  Job#: [id number]     Doc#: [id number]    CC:

## 2022-08-21 NOTE — CONSULTS
8/21/2022 10:20 AM  Service: Urology  Group: [facility] (eBrtram/Cherrie/Rony)    Katherine Brown  [id number]     Chief Complaint: 6 mm right proximal ureteral stone    History of Present Illness: The patient is a 58 y.o. male patient who presents with the above  The patient had some right flank pain  He did present to the ED  He did have a CT done that shows a 6 mm proximal ureteral stone  He has not had a stone in the past  He has not have a fever  She has been voiding ok  He has not seen a urologist in the past     Past Medical History:   Diagnosis Date    BMI 40.0-44.9, adult ([facility] [address] 9/22/2017    Chronic back pain     TIM (generalized anxiety disorder)     GERD (gastroesophageal reflux disease)     Hyperlipidemia     Hypertension     Sleep apnea        Past Surgical History:   Procedure Laterality Date    APPENDECTOMY      CARDIOVASCULAR STRESS TEST  03/01/2021    COLONOSCOPY         Medications Prior to Admission:    Medications Prior to Admission: fenofibrate (TRIGLIDE) 160 MG tablet, TAKE 1 TABLET DAILY  telmisartan (MICARDIS) 80 MG tablet, Take 1 tablet by mouth daily  omeprazole (PRILOSEC) 20 MG delayed release capsule, Take 1 capsule by mouth daily  rosuvastatin (CRESTOR) 20 MG tablet, Take 1 tablet by mouth daily  aspirin 81 MG EC tablet, Take 81 mg by mouth daily  Cholecalciferol (VITAMIN D3) 5000 units TABS, Take 5,000 Units by mouth daily  Omega-3 Fatty Acids (FISH OIL) 1000 MG CAPS, Take 1,000 mg by mouth daily  CPAP Machine MISC, Please provide patient with an auto CPAP with ranges 5-15 cm water pressure with C-Flex of 3 and heated humidification. Please also provide mask, tubing, and supplies to patient fit and comfort. Dx:BUTCH Orders faxed to [facility]    Allergies:    Patient has no known allergies. Social History:    reports that he quit smoking about 27 years ago. His smoking use included cigars. He has a 60.00 pack-year smoking history.  He has never used smokeless tobacco. He reports current alcohol use. He reports that he does not use drugs. Family History:   Non-contributory to this urological problem  family history includes Heart Disease in his father and mother; No Known Problems in his brother, sister, sister, and sister; Other in his mother. Review of Systems:  Respiratory: negative for cough and hemoptysis  Cardiovascular: negative for chest pain and dyspnea  Gastrointestinal: negative for abdominal pain, diarrhea, nausea and vomiting  Derm: negative for rash and skin lesion(s)  Neurological: negative for seizures and tremors  Endocrine: negative for diabetic symptoms including polydipsia and polyuria  : As above in the HPI, otherwise negative  All other reviews are negative    Physical Exam:   Vitals: BP (!) 160/84   Pulse 64   Temp 97.8 °F (36.6 °C) (Oral)   Resp 17   Ht 5' 10\" (1.778 m)   Wt 270 lb (122.5 kg)   SpO2 91%   BMI 38.74 kg/m²   General:  Awake, alert, oriented X 3. Well developed, well nourished, well groomed. No apparent distress. HEENT:  Normocephalic, atraumatic. Pupils equal, round. No scleral icterus. No conjunctival injection. Normal lips, teeth, and gums. No nasal discharge. Neck:  Supple, no masses. Heart:  RRR  Lungs:  No audible wheezing. Respirations symmetric and non-labored. Abdomen:  soft, nontender, no masses, no organomegaly, no peritoneal signs  Extremities:  No clubbing, cyanosis, or edema  Skin:  Warm and dry, no open lesions or rashes  Neuro:  Cranial nerves 2-12 intact, no focal deficits  Rectal: deferred  Genitalia:  Sherman no    Labs:   Recent Labs     08/20/22  1755 08/21/22  0426   WBC 12.5* 15.5*   RBC 5.13 4.98   HGB 15.2 14.7   HCT 46.6 44.1   MCV 90.8 88.6   MCH 29.6 29.5   MCHC 32.6 33.3   RDW 13.2 13.4    306   MPV 10.6 10.4       Recent Labs     08/20/22  1755 08/21/22  0427   CREATININE 1.3* 1.3*     1. Right obstructive uropathy related to a 6 mm stone in the right mid   ureter.   Left nephrolithiasis but no evidence of

## 2022-08-21 NOTE — CONSULTS
[facility]  Hematology/Oncology  Consult      Patient Name: Ck Ackerman  YOB: 1960  PCP: Donny Crawford MD   Referring Provider:      Reason for Consultation:   Chief Complaint   Patient presents with    Flank Pain     Patient states right sided flank/groin pain x one day. +n/v. +diaphoretic. +dark colored urine. Denies other symptoms. History of Present Illness:ulises. Pain is sharp and nonradiating. He reports nausea and emesis. No fever or chills. His labs in ED showed a normal hemoglobin of 14.7 with normal MCV, normal platelet counts with neutrophilic leukocytosis. BMP showed a serum creatinine of 1.3 with an estimated GFR of 56 mL/min. CT scan of abdomen and pelvis showed right-sided obstructive uropathy with hydronephrosis and hydroureter with a mid ureter stone measuring up to 6 mm. There was also evidence of small punctate left nephrolithiasis without obstructive uropathy. Fatty infiltration of the liver was noted. Gallbladder, pa  69-year-old man hospitalized through ED with right-sided flank pain of 1 day duration persistent. No prior history of kidney stoncreas and spleen were unremarkable. Bowel pattern was unremarkable without any evidence of acute appendicitis. A large mass measuring 5.2 x 3.3 cm was noted in the pelvis adjacent to external iliac artery and vein likely representing lymphadenopathy additional right inguinal nodes noted largest measuring 2.6 cm. Urinary bladder prostate appeared unremarkable. Suspicion of possible lymphoproliferative disorder was raised. Back in May patient had a CT lung screen which was negative without pulmonary nodules or mediastinal or hilar lymphadenopathy and no pleural effusions.       Diagnostic Data:     Past Medical History:   Diagnosis Date    BMI 40.0-44.9, adult ([facility] [address] 9/22/2017    Chronic back pain     TIM (generalized anxiety disorder)     GERD (gastroesophageal reflux disease)     Hyperlipidemia Hypertension     Sleep apnea        Patient Active Problem List    Diagnosis Date Noted    Ureterolithiasis 08/20/2022    Ureteral stone 08/20/2022    Class 3 severe obesity due to excess calories with serious comorbidity and body mass index (BMI) of 40.0 to 44.9 in adult ([facility] [address] 11/23/2020    Smoking greater than 30 pack years 11/23/2020    Sleep apnea 09/22/2017    Chronic back pain 09/22/2017    Mixed hyperlipidemia 03/13/2017    TIM (generalized anxiety disorder) 03/13/2017    Gastroesophageal reflux disease without esophagitis 03/13/2017    Hypertriglyceridemia 11/21/2016    Essential hypertension 11/21/2016        Past Surgical History:   Procedure Laterality Date    APPENDECTOMY      CARDIOVASCULAR STRESS TEST  03/01/2021    COLONOSCOPY         Family History  Family History   Problem Relation Age of Onset    Other Mother         dementia    Heart Disease Mother     Heart Disease Father     No Known Problems Sister     No Known Problems Brother     No Known Problems Sister     No Known Problems Sister        Social History    TOBACCO:   reports that he quit smoking about 27 years ago. His smoking use included cigars. He has a 60.00 pack-year smoking history. He has never used smokeless tobacco.  ETOH:   reports current alcohol use. Home Medications  Prior to Admission medications    Medication Sig Start Date End Date Taking?  Authorizing Provider   fenofibrate (TRIGLIDE) 160 MG tablet TAKE 1 TABLET DAILY 12/9/21   Italo Huerta MD   telmisartan (MICARDIS) 80 MG tablet Take 1 tablet by mouth daily 11/22/21   Italo Huerta MD   omeprazole (PRILOSEC) 20 MG delayed release capsule Take 1 capsule by mouth daily 11/22/21   Italo Huerta MD   rosuvastatin (CRESTOR) 20 MG tablet Take 1 tablet by mouth daily 11/22/21   Italo Huerta MD   aspirin 81 MG EC tablet Take 81 mg by mouth daily    Historical Provider, MD   Cholecalciferol (VITAMIN D3) 5000 units TABS Take 5,000 Units by mouth daily    Historical Provider, MD   Omega-3 Fatty Acids (FISH OIL) 1000 MG CAPS Take 1,000 mg by mouth daily    Historical Provider, MD   CPAP Machine MISC Please provide patient with an auto CPAP with ranges 5-15 cm water pressure with C-Flex of 3 and heated humidification. Please also provide mask, tubing, and supplies to patient fit and comfort. Dx:BUTCH  Orders faxed to [facility] 8/16/16   Angie Hua MD       Allergies  No Known Allergies    Review of Systems: Relevant for what ever mentioned in the history of present illness    Constitutional:  No fever chills or rigors. Eyes: No changes in vision, discharge, or pain  ENT: No Headaches, hearing loss or vertigo. No mouth sores or sore throat. No change in taste or smell. Cardiovascular: No chest discomfort, dyspnea on exertion, palpitations or loss of consciousness. or phlebitis. Respiratory: Has no cough or wheezing, Has no sputum production. Has no hemoptysis, Has no pleuritic pain, . Gastrointestinal:+abdominal pain, appetite loss, blood in stools. No change in bowel habits. No hematemesis   Genitourinary: Patient acknowledges no dysuria, trouble voiding, or hematuria. No nocturia or increased frequency. Musculoskeletal: No gait disturbance, weakness or joint complaints. Integumentary: No rash or pruritis. Neurological: No headache, diplopia, change in muscle strength, numbness or tingling. No change in gait, balance, coordination, mood, affect, memory, mentation, behavior. Psychiatric: No anxiety, or depression. Endocrine: No temperature intolerance. No excessive thirst, fluid intake, or urination. No tremor. Hematologic/Lymphatic: No abnormal bruising or bleeding, blood clots or swollen lymph nodes. Allergic/Immunologic: No nasal congestion or hives.       Objective  BP (!) 160/84   Pulse 64   Temp 97.8 °F (36.6 °C) (Oral)   Resp 17   Ht 5' 10\" (1.778 m)   Wt 270 lb (122.5 kg)   SpO2 91%   BMI 38.74 kg/m²     Physical Exam:     General: AAO to person, place, time, in no acute distress,   Head and neck : PERRLA, EOMI . Sclera non icteric. Oropharynx : Clear  Neck: no JVD,  no adenopathy, no carotid bruit  Heart: Regular rate and regular rhythm, no murmur  Lungs: Clear to auscultation   Extremities: No edema,no cyanosis, no clubbing. 2 x 3 cm lump in the distal right thigh subcutaneously  Abdomen: Soft, non-tender;no masses, no organomegaly. right flank tenderness  Skin:  No rash. Neurologic:Cranial nerves grossly intact. No focal motor or sensory deficits . Recent Laboratory Data-   Lab Results   Component Value Date    WBC 15.5 (H) 08/21/2022    HGB 14.7 08/21/2022    HCT 44.1 08/21/2022    MCV 88.6 08/21/2022     08/21/2022    LYMPHOPCT 11.9 (L) 08/20/2022    RBC 4.98 08/21/2022    MCH 29.5 08/21/2022    MCHC 33.3 08/21/2022    RDW 13.4 08/21/2022    NEUTOPHILPCT 78.4 08/20/2022    MONOPCT 7.1 08/20/2022    BASOPCT 0.4 08/20/2022    NEUTROABS 9.78 (H) 08/20/2022    LYMPHSABS 1.48 (L) 08/20/2022    MONOSABS 0.88 08/20/2022    EOSABS 0.20 08/20/2022    BASOSABS 0.05 08/20/2022       Lab Results   Component Value Date     08/21/2022    K 4.2 08/21/2022     08/21/2022    CO2 22 08/21/2022    BUN 23 08/21/2022    CREATININE 1.3 (H) 08/21/2022    GLUCOSE 140 (H) 08/21/2022    CALCIUM 9.9 08/21/2022    PROT 8.0 08/20/2022    LABALBU 5.1 08/20/2022    BILITOT 0.3 08/20/2022    ALKPHOS 66 08/20/2022    AST 33 08/20/2022    ALT 51 (H) 08/20/2022    LABGLOM 56 08/21/2022    GFRAA >60 08/21/2022       No results found for: IRON, TIBC, FERRITIN        Radiology-    CT ABDOMEN PELVIS WO CONTRAST Additional Contrast? None    Result Date: 8/20/2022  EXAMINATION: STONE PROTOCOL CT OF THE ABDOMEN AND PELVIS 8/20/2022 6:16 pm TECHNIQUE: CT of the abdomen and pelvis was performed without the administration of intravenous contrast. Multiplanar reformatted images are provided for review.  Automated exposure control, iterative reconstruction, and/or weight based adjustment of the mA/kV was utilized to reduce the radiation dose to as low as reasonably achievable. COMPARISON: November 6, 2006 HISTORY: ORDERING SYSTEM PROVIDED HISTORY: right flank pain TECHNOLOGIST PROVIDED HISTORY: Additional Contrast?->None Reason for exam:->right flank pain Decision Support Exception - unselect if not a suspected or confirmed emergency medical condition->Emergency Medical Condition (MA) What reading provider will be dictating this exam?->CRC FINDINGS: LOWER CHEST:  Visualized portion of the lower chest demonstrates no acute abnormality. KIDNEYS AND URINARY TRACT: Right obstructive uropathy with hydronephrosis and hydroureter leading to a mid-ureteric stone measuring 6 mm. Mild punctate left nephrolithiasis but no obstructive uropathy on the left. ORGANS: The liver demonstrates fatty infiltration but no focal disease. Gallbladder, pancreas and spleen, adrenals, aorta and IVC appear stable. GI/BOWEL: No bowel obstruction. No evidence of acute appendicitis. PELVIS: A mass can be seen adjacent to the external iliac artery and vein which measures 5.2 x 3.3 cm. This is likely a taye mass indicating significant lymphadenopathy. Significant right inguinal nodes with the largest measuring 1.8 cm. An upper thigh node seen anteriorly measures 2.6 cm. Urinary bladder, prostate and seminal vesicles appear normal. PERITONEUM/RETROPERITONEUM: Retroperitoneal lymphadenopathy. Aortoiliac nodes measure up to 1.6 cm. Mildly prominent nodes anterior to the aorta. Iliocaval nodes measure 1.1 cm at the level of the kidneys. High right iliac nodes are prominent with the largest measuring 1.5 cm. No evidence of left lymphadenopathy. No evidence of mesenteric lymphadenopathy. BONES/SOFT TISSUES: The osseous structures demonstrate no acute abnormality. 1. Right obstructive uropathy related to a 6 mm stone in the right mid ureter.   Left nephrolithiasis but no evidence of left obstructive uropathy. 2. Extensive lymphadenopathy worse in the right lower iliac region with a taye mass measuring 5.2 x 3.3 cm. This was not previously seen. There is also retroperitoneal lymphadenopathy and high right iliac lymphadenopathy. There is suspicion for possible lymphoma. 3. Fatty liver but no focal disease. RECOMMENDATIONS: Workup for possible lymphoma with tissue biopsy of the area of lymphadenopathy. ASSESSMENT/PLAN : 77-year-old man    Right nephrolithiasis with right hydroureter and moderate obstructive uropathy with borderline elevation in serum creatinine and neutrophilic leukocytosis. Urology to be consulted. On IV antibiotics with ceftriaxone. Extensive retroperitoneal lymphadenopathy in the right iliac location highly suspicious for malignancy such as lymphoma. LDH, B2 microglobulin will be obtained. CT-guided biopsy will be planned with IR for tissue diagnosis. Firm 2 x 3 cm lump in the distal right thigh anteriorly and subcutaneous in location to be evaluated after results of CT-guided lymph node biopsy    Thank you for the consult. We will follow. Mahnaz Banks. Lieutenant Heriberto M.D., F.A.C.P.   Electronically signed 8/21/2022 at 10:25 AM

## 2022-08-21 NOTE — DISCHARGE INSTRUCTIONS
A ureteral stent (also know as a double J stent) was inserted during your recent procedure. Unlike a heart \"stent\" which is metal, short, and permanent, this ureteral stent plastic, and temporary. It spans from your kidney, down the ureter, and into your bladder. This will need to be removed either via a procedure in the office or a string in the next week or two. Sometime these stents are left in for long term drainage, but they need to changed every few months. The instructions will be given to you with regards to removal by Dr. Burden/Carmen    IMPORTANT - This ureteral stent will likely cause some frequency with urination, urgency with urination, back/flank pain with urination, and/or blood in the urine. This is very normal.  Taking the pain medications and/or anti-inflammatories will help to manage this discomfort if present. If you have any questions or concerns you can contact Dr. Burden/Cherrie/Rony's office at [phone].

## 2022-08-22 ENCOUNTER — ANESTHESIA EVENT (OUTPATIENT)
Dept: OPERATING ROOM | Age: 62
DRG: 660 | End: 2022-08-22
Payer: COMMERCIAL

## 2022-08-22 ENCOUNTER — ANESTHESIA (OUTPATIENT)
Dept: OPERATING ROOM | Age: 62
DRG: 660 | End: 2022-08-22
Payer: COMMERCIAL

## 2022-08-22 ENCOUNTER — APPOINTMENT (OUTPATIENT)
Dept: GENERAL RADIOLOGY | Age: 62
DRG: 660 | End: 2022-08-22
Payer: COMMERCIAL

## 2022-08-22 ENCOUNTER — APPOINTMENT (OUTPATIENT)
Dept: ULTRASOUND IMAGING | Age: 62
DRG: 660 | End: 2022-08-22
Payer: COMMERCIAL

## 2022-08-22 LAB
INR BLD: 1.1
METER GLUCOSE: 107 MG/DL (ref 74–99)
PROTHROMBIN TIME: 12.4 SEC (ref 9.3–12.4)

## 2022-08-22 PROCEDURE — C1758 CATHETER, URETERAL: HCPCS | Performed by: UROLOGY

## 2022-08-22 PROCEDURE — 3700000000 HC ANESTHESIA ATTENDED CARE: Performed by: UROLOGY

## 2022-08-22 PROCEDURE — 07BH3ZX EXCISION OF RIGHT INGUINAL LYMPHATIC, PERCUTANEOUS APPROACH, DIAGNOSTIC: ICD-10-PCS | Performed by: RADIOLOGY

## 2022-08-22 PROCEDURE — 0T768DZ DILATION OF RIGHT URETER WITH INTRALUMINAL DEVICE, VIA NATURAL OR ARTIFICIAL OPENING ENDOSCOPIC: ICD-10-PCS | Performed by: UROLOGY

## 2022-08-22 PROCEDURE — 76942 ECHO GUIDE FOR BIOPSY: CPT | Performed by: RADIOLOGY

## 2022-08-22 PROCEDURE — 85610 PROTHROMBIN TIME: CPT

## 2022-08-22 PROCEDURE — 88185 FLOWCYTOMETRY/TC ADD-ON: CPT

## 2022-08-22 PROCEDURE — 6370000000 HC RX 637 (ALT 250 FOR IP): Performed by: INTERNAL MEDICINE

## 2022-08-22 PROCEDURE — 2580000003 HC RX 258: Performed by: UROLOGY

## 2022-08-22 PROCEDURE — 1200000000 HC SEMI PRIVATE

## 2022-08-22 PROCEDURE — 74420 UROGRAPHY RTRGR +-KUB: CPT

## 2022-08-22 PROCEDURE — 6360000002 HC RX W HCPCS: Performed by: INTERNAL MEDICINE

## 2022-08-22 PROCEDURE — 3600000014 HC SURGERY LEVEL 4 ADDTL 15MIN: Performed by: UROLOGY

## 2022-08-22 PROCEDURE — 88305 TISSUE EXAM BY PATHOLOGIST: CPT

## 2022-08-22 PROCEDURE — 38505 NEEDLE BIOPSY LYMPH NODES: CPT

## 2022-08-22 PROCEDURE — 36415 COLL VENOUS BLD VENIPUNCTURE: CPT

## 2022-08-22 PROCEDURE — 88342 IMHCHEM/IMCYTCHM 1ST ANTB: CPT

## 2022-08-22 PROCEDURE — 6360000002 HC RX W HCPCS: Performed by: NURSE ANESTHETIST, CERTIFIED REGISTERED

## 2022-08-22 PROCEDURE — 2500000003 HC RX 250 WO HCPCS: Performed by: RADIOLOGY

## 2022-08-22 PROCEDURE — 88184 FLOWCYTOMETRY/ TC 1 MARKER: CPT

## 2022-08-22 PROCEDURE — 87088 URINE BACTERIA CULTURE: CPT

## 2022-08-22 PROCEDURE — 88341 IMHCHEM/IMCYTCHM EA ADD ANTB: CPT

## 2022-08-22 PROCEDURE — C2617 STENT, NON-COR, TEM W/O DEL: HCPCS | Performed by: UROLOGY

## 2022-08-22 PROCEDURE — 2580000003 HC RX 258: Performed by: INTERNAL MEDICINE

## 2022-08-22 PROCEDURE — 38505 NEEDLE BIOPSY LYMPH NODES: CPT | Performed by: RADIOLOGY

## 2022-08-22 PROCEDURE — 2709999900 HC NON-CHARGEABLE SUPPLY: Performed by: UROLOGY

## 2022-08-22 PROCEDURE — 7100000000 HC PACU RECOVERY - FIRST 15 MIN: Performed by: UROLOGY

## 2022-08-22 PROCEDURE — 2500000003 HC RX 250 WO HCPCS: Performed by: NURSE ANESTHETIST, CERTIFIED REGISTERED

## 2022-08-22 PROCEDURE — 7100000001 HC PACU RECOVERY - ADDTL 15 MIN: Performed by: UROLOGY

## 2022-08-22 PROCEDURE — C1769 GUIDE WIRE: HCPCS | Performed by: UROLOGY

## 2022-08-22 PROCEDURE — 3700000001 HC ADD 15 MINUTES (ANESTHESIA): Performed by: UROLOGY

## 2022-08-22 PROCEDURE — 2709999900 US GUIDED NEEDLE PLACEMENT

## 2022-08-22 PROCEDURE — 6360000002 HC RX W HCPCS: Performed by: UROLOGY

## 2022-08-22 PROCEDURE — BT1D1ZZ FLUOROSCOPY OF RIGHT KIDNEY, URETER AND BLADDER USING LOW OSMOLAR CONTRAST: ICD-10-PCS | Performed by: UROLOGY

## 2022-08-22 PROCEDURE — 3600000004 HC SURGERY LEVEL 4 BASE: Performed by: UROLOGY

## 2022-08-22 PROCEDURE — 82232 ASSAY OF BETA-2 PROTEIN: CPT

## 2022-08-22 PROCEDURE — 82962 GLUCOSE BLOOD TEST: CPT

## 2022-08-22 DEVICE — URETERAL STENT
Type: IMPLANTABLE DEVICE | Status: FUNCTIONAL
Brand: PERCUFLEX™

## 2022-08-22 RX ORDER — SODIUM CHLORIDE 9 MG/ML
INJECTION, SOLUTION INTRAVENOUS PRN
Status: DISCONTINUED | OUTPATIENT
Start: 2022-08-22 | End: 2022-08-23 | Stop reason: HOSPADM

## 2022-08-22 RX ORDER — KETAMINE HCL IN NACL, ISO-OSM 100MG/10ML
SYRINGE (ML) INJECTION PRN
Status: DISCONTINUED | OUTPATIENT
Start: 2022-08-22 | End: 2022-08-22 | Stop reason: SDUPTHER

## 2022-08-22 RX ORDER — PROPOFOL 10 MG/ML
INJECTION, EMULSION INTRAVENOUS CONTINUOUS PRN
Status: DISCONTINUED | OUTPATIENT
Start: 2022-08-22 | End: 2022-08-22 | Stop reason: SDUPTHER

## 2022-08-22 RX ORDER — ONDANSETRON 2 MG/ML
4 INJECTION INTRAMUSCULAR; INTRAVENOUS
Status: ACTIVE | OUTPATIENT
Start: 2022-08-22 | End: 2022-08-22

## 2022-08-22 RX ORDER — FENTANYL CITRATE 50 UG/ML
INJECTION, SOLUTION INTRAMUSCULAR; INTRAVENOUS PRN
Status: DISCONTINUED | OUTPATIENT
Start: 2022-08-22 | End: 2022-08-22 | Stop reason: SDUPTHER

## 2022-08-22 RX ORDER — MIDAZOLAM HYDROCHLORIDE 1 MG/ML
INJECTION INTRAMUSCULAR; INTRAVENOUS PRN
Status: DISCONTINUED | OUTPATIENT
Start: 2022-08-22 | End: 2022-08-22 | Stop reason: SDUPTHER

## 2022-08-22 RX ORDER — LIDOCAINE HYDROCHLORIDE 20 MG/ML
INJECTION, SOLUTION INFILTRATION; PERINEURAL
Status: COMPLETED | OUTPATIENT
Start: 2022-08-22 | End: 2022-08-22

## 2022-08-22 RX ORDER — SODIUM CHLORIDE 0.9 % (FLUSH) 0.9 %
5-40 SYRINGE (ML) INJECTION PRN
Status: DISCONTINUED | OUTPATIENT
Start: 2022-08-22 | End: 2022-08-23 | Stop reason: HOSPADM

## 2022-08-22 RX ORDER — SODIUM CHLORIDE, SODIUM LACTATE, POTASSIUM CHLORIDE, CALCIUM CHLORIDE 600; 310; 30; 20 MG/100ML; MG/100ML; MG/100ML; MG/100ML
INJECTION, SOLUTION INTRAVENOUS CONTINUOUS
Status: DISCONTINUED | OUTPATIENT
Start: 2022-08-22 | End: 2022-08-23 | Stop reason: HOSPADM

## 2022-08-22 RX ORDER — MEPERIDINE HYDROCHLORIDE 25 MG/ML
12.5 INJECTION INTRAMUSCULAR; INTRAVENOUS; SUBCUTANEOUS
Status: DISCONTINUED | OUTPATIENT
Start: 2022-08-22 | End: 2022-08-22 | Stop reason: HOSPADM

## 2022-08-22 RX ORDER — SODIUM CHLORIDE 0.9 % (FLUSH) 0.9 %
5-40 SYRINGE (ML) INJECTION EVERY 12 HOURS SCHEDULED
Status: DISCONTINUED | OUTPATIENT
Start: 2022-08-22 | End: 2022-08-23 | Stop reason: HOSPADM

## 2022-08-22 RX ADMIN — CIPROFLOXACIN 400 MG: 2 INJECTION, SOLUTION INTRAVENOUS at 10:32

## 2022-08-22 RX ADMIN — Medication 25 MG: at 10:40

## 2022-08-22 RX ADMIN — SODIUM CHLORIDE, POTASSIUM CHLORIDE, SODIUM LACTATE AND CALCIUM CHLORIDE: 600; 310; 30; 20 INJECTION, SOLUTION INTRAVENOUS at 14:01

## 2022-08-22 RX ADMIN — FENTANYL CITRATE 100 MCG: 50 INJECTION, SOLUTION INTRAMUSCULAR; INTRAVENOUS at 10:37

## 2022-08-22 RX ADMIN — ACETAMINOPHEN 650 MG: 325 TABLET ORAL at 21:27

## 2022-08-22 RX ADMIN — LIDOCAINE HYDROCHLORIDE 6 ML: 20 INJECTION, SOLUTION INFILTRATION; PERINEURAL at 12:54

## 2022-08-22 RX ADMIN — ROSUVASTATIN 20 MG: 20 TABLET, FILM COATED ORAL at 07:33

## 2022-08-22 RX ADMIN — SODIUM CHLORIDE, PRESERVATIVE FREE 10 ML: 5 INJECTION INTRAVENOUS at 21:18

## 2022-08-22 RX ADMIN — ENOXAPARIN SODIUM 30 MG: 100 INJECTION SUBCUTANEOUS at 21:19

## 2022-08-22 RX ADMIN — CEFTRIAXONE 1000 MG: 1 INJECTION, POWDER, FOR SOLUTION INTRAMUSCULAR; INTRAVENOUS at 21:19

## 2022-08-22 RX ADMIN — MIDAZOLAM 2 MG: 1 INJECTION INTRAMUSCULAR; INTRAVENOUS at 10:32

## 2022-08-22 RX ADMIN — MIDAZOLAM 1 MG: 1 INJECTION INTRAMUSCULAR; INTRAVENOUS at 10:50

## 2022-08-22 RX ADMIN — Medication 25 MG: at 10:37

## 2022-08-22 RX ADMIN — PROPOFOL 100 MCG/KG/MIN: 10 INJECTION, EMULSION INTRAVENOUS at 10:37

## 2022-08-22 RX ADMIN — SODIUM CHLORIDE: 9 INJECTION, SOLUTION INTRAVENOUS at 03:23

## 2022-08-22 RX ADMIN — LOSARTAN POTASSIUM 100 MG: 50 TABLET, FILM COATED ORAL at 07:32

## 2022-08-22 RX ADMIN — Medication 20 MG: at 10:55

## 2022-08-22 ASSESSMENT — PAIN SCALES - GENERAL
PAINLEVEL_OUTOF10: 3
PAINLEVEL_OUTOF10: 0

## 2022-08-22 NOTE — ANESTHESIA POSTPROCEDURE EVALUATION
Department of Anesthesiology  Postprocedure Note    Patient: Rikki Nieves  MRN: [id number]  YOB: 1960  Date of evaluation: 8/22/2022      Procedure Summary     Date: 08/22/22 Room / Location: [facility] OR 11 / [facility]    Anesthesia Start: 6309 Anesthesia Stop: 36    Procedure: CYSTOSCOPY RETROGRADE PYELOGRAM,  RIGHT URETER  STENT PLACEMENT Diagnosis:       Ureteral stone      (URETERAL STONE)    Surgeons: Brandy Hernandez MD Responsible Provider: Felicia Navarrete DO    Anesthesia Type: MAC ASA Status: 3          Anesthesia Type: No value filed.     Ne Phase I: Ne Score: 10    Ne Phase II:        Anesthesia Post Evaluation    Patient location during evaluation: bedside  Patient participation: complete - patient cannot participate  Level of consciousness: awake and alert  Airway patency: patent  Nausea & Vomiting: no nausea and no vomiting  Complications: no  Cardiovascular status: blood pressure returned to baseline  Respiratory status: acceptable  Hydration status: euvolemic  Multimodal analgesia pain management approach

## 2022-08-22 NOTE — BRIEF OP NOTE
Brief Postoperative Note      Patient: Ruma Hernandez  YOB: 1960  MRN: [id number]    Date of Procedure: 8/22/2022    Pre-Op Diagnosis: URETERAL STONE jorje    Post-Op Diagnosis: Same       Procedure(s):  CYSTOSCOPY RETROGRADE PYELOGRAM,  RIGHT URETER  STENT PLACEMENT    Surgeon(s):  Sonny Tierney MD    Assistant:      Anesthesia: General    Estimated Blood Loss (mL): Minimal    Complications: None    Specimens:   ID Type Source Tests Collected by Time Destination   1 : URINE FOR C&S Urine Urine, Cystoscopic CULTURE, URINE Sonny Tierney MD 8/22/2022 1055        Implants:  Implant Name Type Inv.  Item Serial No.  Lot No. LRB No. Used Action   [facility] PERCFLX FIRM DUROMETER DBL [place] Naik [id number]  [facility] PERCFLX FIRM DUROMETER DB [facility]- [id number] Right [id number] Implanted         Drains:     Findings:     Electronically signed by Sonny Tierney MD on 8/22/2022 at 11:38 AM

## 2022-08-22 NOTE — PROGRESS NOTES
.Subjective:    Chief complaint:    Pain s/p cystoscopy and right-sided stent insertion  S/p IR guided biopsy    Objective:    BP (!) 145/85   Pulse 74   Temp 98.4 °F (36.9 °C) (Oral)   Resp 16   Ht 5' 10\" (1.778 m)   Wt 270 lb (122.5 kg)   SpO2 96%   BMI 38.74 kg/m²   General : Awake ,alert,no distress. Heart:  RRR, no murmurs, gallops, or rubs. Lungs:  CTA bilaterally, no wheeze, rales or rhonchi  Abd: bowel sounds present, nontender, nondistended, no masses  Extrem:  No clubbing, cyanosis, or edema    CBC:   Lab Results   Component Value Date/Time    WBC 15.5 08/21/2022 04:26 AM    RBC 4.98 08/21/2022 04:26 AM    HGB 14.7 08/21/2022 04:26 AM    HCT 44.1 08/21/2022 04:26 AM    MCV 88.6 08/21/2022 04:26 AM    MCH 29.5 08/21/2022 04:26 AM    MCHC 33.3 08/21/2022 04:26 AM    RDW 13.4 08/21/2022 04:26 AM     08/21/2022 04:26 AM    MPV 10.4 08/21/2022 04:26 AM     BMP:    Lab Results   Component Value Date/Time     08/21/2022 04:27 AM    K 4.2 08/21/2022 04:27 AM     08/21/2022 04:27 AM    CO2 22 08/21/2022 04:27 AM    BUN 23 08/21/2022 04:27 AM    LABALBU 5.1 08/20/2022 05:55 PM    CREATININE 1.3 08/21/2022 04:27 AM    CALCIUM 9.9 08/21/2022 04:27 AM    GFRAA >60 08/21/2022 04:27 AM    LABGLOM 56 08/21/2022 04:27 AM    GLUCOSE 140 08/21/2022 04:27 AM     PT/INR:    Lab Results   Component Value Date/Time    PROTIME 12.4 08/22/2022 09:37 AM    INR 1.1 08/22/2022 09:37 AM     Troponin:    Lab Results   Component Value Date/Time    TROPONINI <0.01 03/02/2021 09:49 PM       Recent Labs     08/20/22 2102   LABURIN <10,000 CFU/mL  Mixed gram positive organisms       No results for input(s): BC in the last 72 hours. No results for input(s): Chalmer Stacks in the last 72 hours.       Current Facility-Administered Medications:     sodium chloride flush 0.9 % injection 5-40 mL, 5-40 mL, IntraVENous, 2 times per day, Marlyce Course Finamore, DO    sodium chloride flush 0.9 % injection 5-40 mL, 5-40 mL, IntraVENous, PRN, Chiny[employer] Finamore, DO    0.9 % sodium chloride infusion, , IntraVENous, PRN, Chinyere Hima Finamore, DO    ondansetron [facility]) injection 4 mg, 4 mg, IntraVENous, Once PRN, Chinyere Hima Finamore, DO    lactated ringers infusion, , IntraVENous, Continuous, Ethyl Sunita Grier MD, Last Rate: 125 mL/hr at 08/22/22 1401, New Bag at 08/22/22 1401    morphine (PF) injection 2 mg, 2 mg, IntraVENous, Q4H PRN, Gloriajean Prude Malmer, DO, 2 mg at 08/21/22 0501    rosuvastatin (CRESTOR) tablet 20 mg, 20 mg, Oral, Daily, Gloriajean Prude Malmer, DO, 20 mg at 08/22/22 1742    sodium chloride flush 0.9 % injection 5-40 mL, 5-40 mL, IntraVENous, 2 times per day, Mary Dukes,     sodium chloride flush 0.9 % injection 5-40 mL, 5-40 mL, IntraVENous, PRN, Gloriajean Prude Malmer, DO    0.9 % sodium chloride infusion, , IntraVENous, PRN, Gloriajean Prude Malmer, DO    enoxaparin Sodium (LOVENOX) injection 30 mg, 30 mg, SubCUTAneous, BID, Gloriajean Prude Malmer, DO, 30 mg at 08/21/22 2024    ondansetron (ZOFRAN-ODT) disintegrating tablet 4 mg, 4 mg, Oral, Q8H PRN **OR** ondansetron (ZOFRAN) injection 4 mg, 4 mg, IntraVENous, Q6H PRN, Gloriajean Prude Malmer, DO, 4 mg at 08/21/22 3613    polyethylene glycol (GLYCOLAX) packet 17 g, 17 g, Oral, Daily PRN, Gloriajean Prude Malmer, DO    acetaminophen (TYLENOL) tablet 650 mg, 650 mg, Oral, Q6H PRN, 650 mg at 08/21/22 2021 **OR** acetaminophen (TYLENOL) suppository 650 mg, 650 mg, Rectal, Q6H PRN, Gloriajean Prude Malmer,     0.9 % sodium chloride infusion, , IntraVENous, Continuous, Radhikariaemerson Wiley DO, Last Rate: 500 mL/hr at 08/22/22 1112, Rate Change at 08/22/22 1112    oxyCODONE-acetaminophen (PERCOCET) 5-325 MG per tablet 1 tablet, 1 tablet, Oral, Q4H PRN, Louann Wiley DO, 1 tablet at 08/21/22 0744    cefTRIAXone (ROCEPHIN) 1,000 mg in sterile water 10 mL IV syringe, 1,000 mg, IntraVENous, Q24H, Radhikariaemerson Wiley, , 1,000 mg at 08/21/22 2022    pantoprazole (PROTONIX) tablet 40 mg, 40 mg, Oral, MARC iVllavicencio Malmer, DO, 40 mg at 08/21/22 0501    losartan (COZAAR) tablet 100 mg, 100 mg, Oral, Daily, Crystal Perkins Malmer, DO, 100 mg at 08/22/22 7430    ADULT DIET; Regular    FL RETROGRADE PYELOGRAM W WO KUB   Final Result   Intraprocedural fluoroscopic spot images as above. See separate procedure   report for more information. CT ABDOMEN PELVIS WO CONTRAST Additional Contrast? None   Final Result   1. Right obstructive uropathy related to a 6 mm stone in the right mid   ureter. Left nephrolithiasis but no evidence of left obstructive uropathy. 2. Extensive lymphadenopathy worse in the right lower iliac region with a   taye mass measuring 5.2 x 3.3 cm. This was not previously seen. There is   also retroperitoneal lymphadenopathy and high right iliac lymphadenopathy. There is suspicion for possible lymphoma. 3. Fatty liver but no focal disease. RECOMMENDATIONS:   Workup for possible lymphoma with tissue biopsy of the area of   lymphadenopathy. US GUIDED NEEDLE PLACEMENT    (Results Pending)   US BIOPSY LYMPH NODE    (Results Pending)       Assessment:    Principal Problem:    Ureterolithiasis  Active Problems:    Ureteral stone  Resolved Problems:    * No resolved hospital problems. *      Plan:    Patient's pain is resolved  Wanting to home  Currently on IV antibiotics  Home once okay with oncology and neurology    Magi Garcia MD  4:06 PM  8/22/2022    NOTE: This report was transcribed using voice recognition software.  Every effort was made to ensure accuracy; however, inadvertent transcription errors may be present

## 2022-08-22 NOTE — INTERVAL H&P NOTE
H&P Update    Patient's History and Physical  was reviewed. The patient appears likely to able to tolerate the procedure. Risk and benefits discussed including ultimate complications, possibly death and consent obtained. Patient and/family had the opportunity to ask questions. All questions were answered and patient/family wishes to proceed.      Chris Cain, II

## 2022-08-22 NOTE — ANESTHESIA PRE PROCEDURE
chloride infusion   IntraVENous PRN Zackary A Bertram, DO        ciprofloxacin (CIPRO) IVPB 400 mg  400 mg IntraVENous See Admin Instructions Zackary A Bertram, DO        rosuvastatin (CRESTOR) tablet 20 mg  20 mg Oral Daily Kika Loop Malmer, DO   20 mg at 08/22/22 5776    sodium chloride flush 0.9 % injection 5-40 mL  5-40 mL IntraVENous 2 times per day Kika Loop Malmer, DO        sodium chloride flush 0.9 % injection 5-40 mL  5-40 mL IntraVENous PRN Kika Loop Malmer, DO        0.9 % sodium chloride infusion   IntraVENous PRN Kika Loop Malmer, DO        enoxaparin Sodium (LOVENOX) injection 30 mg  30 mg SubCUTAneous BID Kika Loop Malmer, DO   30 mg at 08/21/22 2024    ondansetron (ZOFRAN-ODT) disintegrating tablet 4 mg  4 mg Oral Q8H PRN Kika Loop Malmer, DO        Or    [facility] injection 4 mg  4 mg IntraVENous Q6H PRN Kika Loop Malmer, DO   4 mg at 08/21/22 6350    polyethylene glycol (GLYCOLAX) packet 17 g  17 g Oral Daily PRN Kika Loop Malmer, DO        acetaminophen (TYLENOL) tablet 650 mg  650 mg Oral Q6H PRN Kika Loop Malmer, DO   650 mg at 08/21/22 2021    Or    acetaminophen (TYLENOL) suppository 650 mg  650 mg Rectal Q6H PRN Kika Loop Malmer, DO        0.9 % sodium chloride infusion   IntraVENous Continuous Kika Loop Malmer,  mL/hr at 08/22/22 0323 New Bag at 08/22/22 0323    oxyCODONE-acetaminophen (PERCOCET) 5-325 MG per tablet 1 tablet  1 tablet Oral Q4H PRN Kika Loop Malmer, DO   1 tablet at 08/21/22 0744    cefTRIAXone (ROCEPHIN) 1,000 mg in sterile water 10 mL IV syringe  1,000 mg IntraVENous Q24H Kika Loop Malmer, DO   1,000 mg at 08/21/22 2022    pantoprazole (PROTONIX) tablet 40 mg  40 mg Oral QAM AC Kika Loop Malmer, DO   40 mg at 08/21/22 0501    losartan (COZAAR) tablet 100 mg  100 mg Oral Daily Kika Loop Malmer, DO   100 mg at 08/22/22 8439       Allergies:  No Known Allergies    Problem List:    Patient Active Problem List   Diagnosis Code    Hypertriglyceridemia E78.1  Essential hypertension I10    Mixed hyperlipidemia E78.2    TIM (generalized anxiety disorder) F41.1    Gastroesophageal reflux disease without esophagitis K21.9    Sleep apnea G47.30    Chronic back pain M54.9, G89.29    Class 3 severe obesity due to excess calories with serious comorbidity and body mass index (BMI) of 40.0 to 44.9 in adult ([facility]) E66.01, Z68.41    Smoking greater than 30 pack years F17.210    Ureterolithiasis N20.1    Ureteral stone N20.1       Past Medical History:        Diagnosis Date    [facility] 40.0-44.9[facility]    Chronic back pain     TIM (generalized anxiety disorder)     GERD (gastroesophageal reflux disease)     Hyperlipidemia     Hypertension     Sleep apnea        Past Surgical History:        Procedure Laterality Date    APPENDECTOMY      CARDIOVASCULAR STRESS TEST  2021    COLONOSCOPY         Social History:    Social History     Tobacco Use    Smoking status: Former     Packs/day: 3.00     Years: 20.00     Pack years: 60.00     Types: Cigars, Cigarettes     Quit date: 10/3/1994     Years since quittin.9    Smokeless tobacco: Never    Tobacco comments:     smokes 2 cigars a month and quit cigs 23 years ago   Substance Use Topics    Alcohol use: Yes     Comment: social                                 Counseling given: Not Answered  Tobacco comments: smokes 2 cigars a month and quit cigs 23 years ago      Vital Signs (Current):   Vitals:    22 2215 22 0729 22 1930 22 0812   BP: (!) 163/88 (!) 160/84 (!) 160/78 (!) 146/88   Pulse: 68 64 75 84   Resp:   17    Temp: 98.1 °F (36.7 °C) 97.8 °F (36.6 °C) 98.4 °F (36.9 °C) 98.4 °F (36.9 °C)   TempSrc: Oral Oral Temporal    SpO2:  91% 96% 95%   Weight:       Height:                                                  BP Readings from Last 3 Encounters:   22 (!) 146/88   22 138/80   22 130/84       NPO Status:                            > 8hrs BMI:   Wt Readings from Last 3 Encounters:   08/20/22 270 lb (122.5 kg)   08/17/22 269 lb (122 kg)   05/09/22 281 lb (127.5 kg)     Body mass index is 38.74 kg/m². CBC:   Lab Results   Component Value Date/Time    WBC 15.5 08/21/2022 04:26 AM    RBC 4.98 08/21/2022 04:26 AM    HGB 14.7 08/21/2022 04:26 AM    HCT 44.1 08/21/2022 04:26 AM    MCV 88.6 08/21/2022 04:26 AM    RDW 13.4 08/21/2022 04:26 AM     08/21/2022 04:26 AM       CMP:   Lab Results   Component Value Date/Time     08/21/2022 04:27 AM    K 4.2 08/21/2022 04:27 AM     08/21/2022 04:27 AM    CO2 22 08/21/2022 04:27 AM    BUN 23 08/21/2022 04:27 AM    CREATININE 1.3 08/21/2022 04:27 AM    GFRAA >60 08/21/2022 04:27 AM    LABGLOM 56 08/21/2022 04:27 AM    GLUCOSE 140 08/21/2022 04:27 AM    PROT 8.0 08/20/2022 05:55 PM    CALCIUM 9.9 08/21/2022 04:27 AM    BILITOT 0.3 08/20/2022 05:55 PM    ALKPHOS 66 08/20/2022 05:55 PM    AST 33 08/20/2022 05:55 PM    ALT 51 08/20/2022 05:55 PM       POC Tests: No results for input(s): POCGLU, POCNA, POCK, POCCL, POCBUN, POCHEMO, POCHCT in the last 72 hours.     Coags:   Lab Results   Component Value Date/Time    PROTIME 12.4 08/22/2022 09:37 AM    INR 1.1 08/22/2022 09:37 AM    APTT 33.8 03/02/2021 09:16 AM       HCG (If Applicable): No results found for: PREGTESTUR, PREGSERUM, HCG, HCGQUANT     ABGs: No results found for: PHART, PO2ART, ZHK6WUR, VSX4UJC, BEART, U6NWAIVS     Type & Screen (If Applicable):  No results found for: LABABO, LABRH    Drug/Infectious Status (If Applicable):  No results found for: HIV, HEPCAB    COVID-19 Screening (If Applicable): No results found for: COVID19        Anesthesia Evaluation  Patient summary reviewed and Nursing notes reviewed  Airway: Mallampati: III  TM distance: >3 FB   Neck ROM: full  Mouth opening: > = 3 FB   Dental:      Comment: Caps in back     Pulmonary:normal exam    (+) sleep apnea: Cardiovascular:    (+) hypertension:,         Rhythm: regular                      Neuro/Psych:   (+) psychiatric history:            GI/Hepatic/Renal:   (+) GERD: well controlled,           Endo/Other:                     Abdominal:   (+) obese,     Abdomen: soft. Vascular: Other Findings:           Anesthesia Plan      MAC     ASA 3       Induction: intravenous. MIPS: Prophylactic antiemetics administered. Anesthetic plan and risks discussed with patient. Use of blood products discussed with whom consented to blood products. Plan discussed with CRNA.                     Alfonso Ballesteros DO   8/22/2022

## 2022-08-22 NOTE — PROGRESS NOTES
[facility]  Hematology/Oncology  Consult      Patient Name: Aki Castillo  YOB: 1960  PCP: Slime Lechuga MD   Referring Provider:      Reason for Consultation:   Chief Complaint   Patient presents with    Flank Pain     Patient states right sided flank/groin pain x one day. +n/v. +diaphoretic. +dark colored urine. Denies other symptoms. Subjective: Patient is status post cystoscopy and LN biopsy today. History of Present Illness:ulises. Pain is sharp and nonradiating. He reports nausea and emesis. No fever or chills. His labs in ED showed a normal hemoglobin of 14.7 with normal MCV, normal platelet counts with neutrophilic leukocytosis. BMP showed a serum creatinine of 1.3 with an estimated GFR of 56 mL/min. CT scan of abdomen and pelvis showed right-sided obstructive uropathy with hydronephrosis and hydroureter with a mid ureter stone measuring up to 6 mm. There was also evidence of small punctate left nephrolithiasis without obstructive uropathy. Fatty infiltration of the liver was noted. Gallbladder, pa  22-year-old man hospitalized through ED with right-sided flank pain of 1 day duration persistent. No prior history of kidney stoncreas and spleen were unremarkable. Bowel pattern was unremarkable without any evidence of acute appendicitis. A large mass measuring 5.2 x 3.3 cm was noted in the pelvis adjacent to external iliac artery and vein likely representing lymphadenopathy additional right inguinal nodes noted largest measuring 2.6 cm. Urinary bladder prostate appeared unremarkable. Suspicion of possible lymphoproliferative disorder was raised. Back in May patient had a CT lung screen which was negative without pulmonary nodules or mediastinal or hilar lymphadenopathy and no pleural effusions.       Diagnostic Data:     Past Medical History:   Diagnosis Date    BMI 40.0-44.9, adult ([facility] 9/22/2017    Chronic back pain     TIM (generalized anxiety disorder)     GERD (gastroesophageal reflux disease)     Hyperlipidemia     Hypertension     Sleep apnea        Patient Active Problem List    Diagnosis Date Noted    Ureterolithiasis 08/20/2022    Ureteral stone 08/20/2022    Class 3 severe obesity due to excess calories with serious comorbidity and body mass index (BMI) of 40.0 to 44.9 in adult ([facility] 11/23/2020    Smoking greater than 30 pack years 11/23/2020    Sleep apnea 09/22/2017    Chronic back pain 09/22/2017    Mixed hyperlipidemia 03/13/2017    TIM (generalized anxiety disorder) 03/13/2017    Gastroesophageal reflux disease without esophagitis 03/13/2017    Hypertriglyceridemia 11/21/2016    Essential hypertension 11/21/2016        Past Surgical History:   Procedure Laterality Date    APPENDECTOMY      CARDIOVASCULAR STRESS TEST  03/01/2021    COLONOSCOPY         Family History  Family History   Problem Relation Age of Onset    Other Mother         dementia    Heart Disease Mother     Heart Disease Father     No Known Problems Sister     No Known Problems Brother     No Known Problems Sister     No Known Problems Sister        Social History    TOBACCO:   reports that he quit smoking about 27 years ago. His smoking use included cigars. He has a 60.00 pack-year smoking history. He has never used smokeless tobacco.  ETOH:   reports current alcohol use. Home Medications  Prior to Admission medications    Medication Sig Start Date End Date Taking?  Authorizing Provider   fenofibrate (TRIGLIDE) 160 MG tablet TAKE 1 TABLET DAILY 12/9/21   Magdalneo Kerr MD   telmisartan (MICARDIS) 80 MG tablet Take 1 tablet by mouth daily 11/22/21   Magdaleno Kerr MD   omeprazole (PRILOSEC) 20 MG delayed release capsule Take 1 capsule by mouth daily 11/22/21   Magdaleno Kerr MD   rosuvastatin (CRESTOR) 20 MG tablet Take 1 tablet by mouth daily 11/22/21   Magdaleno Kerr MD   aspirin 81 MG EC tablet Take 81 mg by mouth daily    Historical Provider, MD   Cholecalciferol (VITAMIN D3) 5000 units TABS Take 5,000 Units by mouth daily    Historical Provider, MD   Omega-3 Fatty Acids (FISH OIL) 1000 MG CAPS Take 1,000 mg by mouth daily    Historical Provider, MD   CPAP Machine MISC Please provide patient with an auto CPAP with ranges 5-15 cm water pressure with C-Flex of 3 and heated humidification. Please also provide mask, tubing, and supplies to patient fit and comfort. Dx:BUTCH  Orders faxed to [facility] 8/16/16   Daryl Bailey MD       Allergies  No Known Allergies    Review of Systems: Relevant for what ever mentioned in the history of present illness    Constitutional:  No fever chills or rigors. Eyes: No changes in vision, discharge, or pain  ENT: No Headaches, hearing loss or vertigo. No mouth sores or sore throat. No change in taste or smell. Cardiovascular: No chest discomfort, dyspnea on exertion, palpitations or loss of consciousness. or phlebitis. Respiratory: Has no cough or wheezing, Has no sputum production. Has no hemoptysis, Has no pleuritic pain, . Gastrointestinal:+abdominal pain, appetite loss, blood in stools. No change in bowel habits. No hematemesis   Genitourinary: Patient acknowledges no dysuria, trouble voiding, or hematuria. No nocturia or increased frequency. Musculoskeletal: No gait disturbance, weakness or joint complaints. Integumentary: No rash or pruritis. Neurological: No headache, diplopia, change in muscle strength, numbness or tingling. No change in gait, balance, coordination, mood, affect, memory, mentation, behavior. Psychiatric: No anxiety, or depression. Endocrine: No temperature intolerance. No excessive thirst, fluid intake, or urination. No tremor. Hematologic/Lymphatic: No abnormal bruising or bleeding, blood clots or swollen lymph nodes. Allergic/Immunologic: No nasal congestion or hives.       Objective  BP (!) 159/88   Pulse 76   Temp 97.3 °F (36.3 °C) (Temporal)   Resp 18   Ht 5' 10\" (1.778 m)   Wt 270 lb (122.5 kg)   SpO2 95%   BMI 38.74 kg/m²     Physical Exam:     General: AAO to person, place, time, in no acute distress,   Head and neck : PERRLA, EOMI . Sclera non icteric. Oropharynx : Clear  Neck: no JVD,  no adenopathy, no carotid bruit  Heart: Regular rate and regular rhythm, no murmur  Lungs: Clear to auscultation   Extremities: No edema,no cyanosis, no clubbing. 2 x 3 cm lump in the distal right thigh subcutaneously  Abdomen: Soft, non-tender;no masses, no organomegaly. right flank tenderness  Skin:  No rash. Neurologic:Cranial nerves grossly intact. No focal motor or sensory deficits .     Recent Laboratory Data-   Lab Results   Component Value Date    WBC 15.5 (H) 08/21/2022    HGB 14.7 08/21/2022    HCT 44.1 08/21/2022    MCV 88.6 08/21/2022     08/21/2022    LYMPHOPCT 11.9 (L) 08/20/2022    RBC 4.98 08/21/2022    MCH 29.5 08/21/2022    MCHC 33.3 08/21/2022    RDW 13.4 08/21/2022    NEUTOPHILPCT 78.4 08/20/2022    MONOPCT 7.1 08/20/2022    BASOPCT 0.4 08/20/2022    NEUTROABS 9.78 (H) 08/20/2022    LYMPHSABS 1.48 (L) 08/20/2022    MONOSABS 0.88 08/20/2022    EOSABS 0.20 08/20/2022    BASOSABS 0.05 08/20/2022       Lab Results   Component Value Date     08/21/2022    K 4.2 08/21/2022     08/21/2022    CO2 22 08/21/2022    BUN 23 08/21/2022    CREATININE 1.3 (H) 08/21/2022    GLUCOSE 140 (H) 08/21/2022    CALCIUM 9.9 08/21/2022    PROT 8.0 08/20/2022    LABALBU 5.1 08/20/2022    BILITOT 0.3 08/20/2022    ALKPHOS 66 08/20/2022    AST 33 08/20/2022    ALT 51 (H) 08/20/2022    LABGLOM 56 08/21/2022    GFRAA >60 08/21/2022       No results found for: IRON, TIBC, FERRITIN        Radiology-    CT ABDOMEN PELVIS WO CONTRAST Additional Contrast? None    Result Date: 8/20/2022  EXAMINATION: STONE PROTOCOL CT OF THE ABDOMEN AND PELVIS 8/20/2022 6:16 pm TECHNIQUE: CT of the abdomen and pelvis was performed without the administration of intravenous contrast. Multiplanar reformatted images are provided for review. Automated exposure control, iterative reconstruction, and/or weight based adjustment of the mA/kV was utilized to reduce the radiation dose to as low as reasonably achievable. COMPARISON: November 6, 2006 HISTORY: ORDERING SYSTEM PROVIDED HISTORY: right flank pain TECHNOLOGIST PROVIDED HISTORY: Additional Contrast?->None Reason for exam:->right flank pain Decision Support Exception - unselect if not a suspected or confirmed emergency medical condition->Emergency Medical Condition (MA) What reading provider will be dictating this exam?->CRC FINDINGS: LOWER CHEST:  Visualized portion of the lower chest demonstrates no acute abnormality. KIDNEYS AND URINARY TRACT: Right obstructive uropathy with hydronephrosis and hydroureter leading to a mid-ureteric stone measuring 6 mm. Mild punctate left nephrolithiasis but no obstructive uropathy on the left. ORGANS: The liver demonstrates fatty infiltration but no focal disease. Gallbladder, pancreas and spleen, adrenals, aorta and IVC appear stable. GI/BOWEL: No bowel obstruction. No evidence of acute appendicitis. PELVIS: A mass can be seen adjacent to the external iliac artery and vein which measures 5.2 x 3.3 cm. This is likely a taye mass indicating significant lymphadenopathy. Significant right inguinal nodes with the largest measuring 1.8 cm. An upper thigh node seen anteriorly measures 2.6 cm. Urinary bladder, prostate and seminal vesicles appear normal. PERITONEUM/RETROPERITONEUM: Retroperitoneal lymphadenopathy. Aortoiliac nodes measure up to 1.6 cm. Mildly prominent nodes anterior to the aorta. Iliocaval nodes measure 1.1 cm at the level of the kidneys. High right iliac nodes are prominent with the largest measuring 1.5 cm. No evidence of left lymphadenopathy. No evidence of mesenteric lymphadenopathy. BONES/SOFT TISSUES: The osseous structures demonstrate no acute abnormality.      1. Right obstructive uropathy related to a 6 mm stone in the right mid ureter. Left nephrolithiasis but no evidence of left obstructive uropathy. 2. Extensive lymphadenopathy worse in the right lower iliac region with a taye mass measuring 5.2 x 3.3 cm. This was not previously seen. There is also retroperitoneal lymphadenopathy and high right iliac lymphadenopathy. There is suspicion for possible lymphoma. 3. Fatty liver but no focal disease. RECOMMENDATIONS: Workup for possible lymphoma with tissue biopsy of the area of lymphadenopathy. ASSESSMENT/PLAN :   80-year-old man  Right nephrolithiasis with right hydroureter and moderate obstructive uropathy with borderline elevation in serum creatinine and neutrophilic leukocytosis. Urology to be consulted. On IV antibiotics with ceftriaxone. Extensive retroperitoneal lymphadenopathy in the right iliac location highly suspicious for malignancy such as lymphoma  LDH, B2 microglobulin will be obtained. CT-guided biopsy will be planned with IR for tissue diagnosis    Firm 2 x 3 cm lump in the distal right thigh anteriorly and subcutaneous in location to be evaluated after results of CT-guided lymph node biopsy    8/22/2022  - Extensive RP lymphadenopathy in the R iliac location highly suspicious for malignancy. S/p IR guided biopsy R inguinal mass 8/22/22. Pathology pending  - , B2 microglobulin pending.   - Urology following. S/p cystoscopy with right ureter stent placement 8/22/22. On IV antibiotics with ceftriaxone.    - We will follow      Pauly Macias MD  Electronically signed 8/22/2022 at 1:22 PM

## 2022-08-22 NOTE — CARE COORDINATION
Patient is currently in [address] [place] [address] for [facility] LITHOTRIPSY, URETEROSCOPY, STONE MANIPULATION, J STENT INSERTION for 6 mm right proximal ureteral stone by urology. Await further input and plan. Currently on On IV antibiotics with ceftriaxone. Per hem/onc note yesterday, Extensive retroperitoneal lymphadenopathy in the right iliac location highly suspicious for malignancy such as lymphoma. LDH, B2 microglobulin will be obtained. CT-guided biopsy will be planned with IR for tissue diagnosis. Firm 2 x 3 cm lump in the distal right thigh anteriorly and subcutaneous in location to be evaluated after results of CT-guided lymph node biopsy. Cm/Sw will follow for any post op discharge needs.   Oksana Howard RN CM  [phone]

## 2022-08-22 NOTE — PROGRESS NOTES
Dr. Brandy Kent. Cherrie updated patient in PACU. Patient going to IR from PACU. Patient's girlfriend will be sent to Radiology.

## 2022-08-22 NOTE — BRIEF OP NOTE
Brief Postoperative Note    Odilia Guevraa  YOB: 1960  [id number]    Pre-operative Diagnosis: right inguinal mass plan bx    Post-operative Diagnosis: Same    Procedure: biopsy    Estimated Blood Loss: < 10 cc    Surgeon: Naz PATRICK     Complications: none    Specimens obtained: Yes, biopsy of mass    Findings: biopsy of a mass      Naz Washington II, MD   8/22/2022 12:45 PM

## 2022-08-23 VITALS
TEMPERATURE: 98.1 F | RESPIRATION RATE: 18 BRPM | WEIGHT: 270 LBS | DIASTOLIC BLOOD PRESSURE: 91 MMHG | BODY MASS INDEX: 38.65 KG/M2 | OXYGEN SATURATION: 96 % | SYSTOLIC BLOOD PRESSURE: 149 MMHG | HEART RATE: 73 BPM | HEIGHT: 70 IN

## 2022-08-23 LAB — URINE CULTURE, ROUTINE: NORMAL

## 2022-08-23 PROCEDURE — 6370000000 HC RX 637 (ALT 250 FOR IP): Performed by: INTERNAL MEDICINE

## 2022-08-23 PROCEDURE — 6360000002 HC RX W HCPCS: Performed by: INTERNAL MEDICINE

## 2022-08-23 RX ADMIN — PANTOPRAZOLE SODIUM 40 MG: 40 TABLET, DELAYED RELEASE ORAL at 05:47

## 2022-08-23 RX ADMIN — ENOXAPARIN SODIUM 30 MG: 100 INJECTION SUBCUTANEOUS at 07:28

## 2022-08-23 RX ADMIN — LOSARTAN POTASSIUM 100 MG: 50 TABLET, FILM COATED ORAL at 07:28

## 2022-08-23 RX ADMIN — ROSUVASTATIN 20 MG: 20 TABLET, FILM COATED ORAL at 07:28

## 2022-08-23 NOTE — CARE COORDINATION
Discharge order noted. Patient is POD#1 Cystopanendoscopy, retrograde pyelogram, attempted  ureteroscopy, stent placement for Right ureteral stone. Per urology note today, Bernal Ready for discharge  Will arrange for follow up ureteroscopy. Will need follow up for lymph node Bx. I met with patient and girlfriend Mary Kay Garzamarianelaprem in room to explain role and discuss transition of care. Patient said he has no needs for discharge and Mary Kay Kirby will be transporting him home today.   Deshaun Ruelas RN   [phone]

## 2022-08-23 NOTE — OP NOTE
[address]                  [address] [facility],  [facility]                                OPERATIVE REPORT    PATIENT NAME: Raiza Perkins                      :        1960  MED REC NO:   [id number]                            ROOM:       [id number]  ACCOUNT NO:   [de-identified]                           ADMIT DATE: 2022  PROVIDER:     Jessika Ornelas MD    DATE OF PROCEDURE:  2022    PREOPERATIVE DIAGNOSIS:  Right ureteral stone. POSTOPERATIVE DIAGNOSIS:  Right ureteral stone. OPERATION:  Cystopanendoscopy, retrograde pyelogram, attempted  ureteroscopy, stent placement. SURGEON:  Jessika rOnelas MD    ANESTHESIA:  LMAC. ESTIMATED BLOOD LOSS:  Less than 50. OPERATION REPORT:  With the patient in the lithotomy position under  satisfactory sedation, the genitalia were prepped and draped in a  sterile manner. A 21-Namibian panendoscope passed easily through the  pendulous and membranous urethra. There were no strictures or lesions  seen. Prostatic fossa showed trilobar development. No significant  outlet obstruction. Upon entering the bladder, the trigone was  symmetrical.  The ureteral orifices of normal configuration and  location. Following inspection of the bladder which revealed normal  findings, a retrograde on the right showed a hydroureter with the stone  apparently in the mid ureter with hydronephrotic changes. I was able to  pass a wire beyond this, following which attempts at passing a  ureteroscope were totally unsuccessful and that negotiating the distal  ureter was extremely difficult. I elected to place a stent which was a  26 cm 6-Namibian double-J stent on the right side, following which  retrograde on the left showed normal upper tracts. The bladder was  drained. The patient was sent to recovery room.   Once he recovers, he  is going to be brought to IR where biopsy of his retroperitoneal lymph  node is to be carried out. Once the patient has a recovered, he could be discharged. He will be  readmitted in two weeks or so for ureteroscopy and laser lithotripsy of  his ureteral calculus.         Lamar Peñaloza MD    D: 08/22/2022 11:47:57       T: 08/22/2022 11:50:36     [email or web address]  Job#: [id number]     Doc#: [id number]

## 2022-08-23 NOTE — PLAN OF CARE
Problem: Pain  Goal: Verbalizes/displays adequate comfort level or baseline comfort level  8/23/2022 0019 by Brittaney Rondon RN  Outcome: Progressing  8/23/2022 0019 by Brittaney Rondon RN  Outcome: Progressing     Problem: Discharge Planning  Goal: Discharge to home or other facility with appropriate resources  8/23/2022 0019 by Brittaney Rondon RN  Outcome: Progressing  8/23/2022 0019 by Brittaney Rondon RN  Outcome: Progressing

## 2022-08-23 NOTE — PROGRESS NOTES
.Subjective:    Chief complaint:  No pain issues  Feeling okay    Objective:    BP (!) 149/91   Pulse 73   Temp 98.1 °F (36.7 °C) (Oral)   Resp 18   Ht 5' 10\" (1.778 m)   Wt 270 lb (122.5 kg)   SpO2 96%   BMI 38.74 kg/m²   General : Awake ,alert,no distress. Heart:  RRR, no murmurs, gallops, or rubs. Lungs:  CTA bilaterally, no wheeze, rales or rhonchi  Abd: bowel sounds present, nontender, nondistended, no masses  Extrem:  No clubbing, cyanosis, or edema    CBC:   Lab Results   Component Value Date/Time    WBC 15.5 08/21/2022 04:26 AM    RBC 4.98 08/21/2022 04:26 AM    HGB 14.7 08/21/2022 04:26 AM    HCT 44.1 08/21/2022 04:26 AM    MCV 88.6 08/21/2022 04:26 AM    MCH 29.5 08/21/2022 04:26 AM    MCHC 33.3 08/21/2022 04:26 AM    RDW 13.4 08/21/2022 04:26 AM     08/21/2022 04:26 AM    MPV 10.4 08/21/2022 04:26 AM     BMP:    Lab Results   Component Value Date/Time     08/21/2022 04:27 AM    K 4.2 08/21/2022 04:27 AM     08/21/2022 04:27 AM    CO2 22 08/21/2022 04:27 AM    BUN 23 08/21/2022 04:27 AM    LABALBU 5.1 08/20/2022 05:55 PM    CREATININE 1.3 08/21/2022 04:27 AM    CALCIUM 9.9 08/21/2022 04:27 AM    GFRAA >60 08/21/2022 04:27 AM    LABGLOM 56 08/21/2022 04:27 AM    GLUCOSE 140 08/21/2022 04:27 AM     PT/INR:    Lab Results   Component Value Date/Time    PROTIME 12.4 08/22/2022 09:37 AM    INR 1.1 08/22/2022 09:37 AM     Troponin:    Lab Results   Component Value Date/Time    TROPONINI <0.01 03/02/2021 09:49 PM       Recent Labs     08/22/22  1055   LABURIN Growth not present, incubation continues     No results for input(s): BC in the last 72 hours. No results for input(s): Derryl Drum in the last 72 hours.       Current Facility-Administered Medications:     sodium chloride flush 0.9 % injection 5-40 mL, 5-40 mL, IntraVENous, 2 times per day, Samanta Hidalgo, DO    sodium chloride flush 0.9 % injection 5-40 mL, 5-40 mL, IntraVENous, PRN, Samanta Teddy Finamore, DO    0.9 % sodium chloride infusion, , IntraVENous, PRN, Indigo Hidalgo,     lactated ringers infusion, , IntraVENous, Continuous, Fransisco Grier MD, Last Rate: 125 mL/hr at 08/22/22 1401, New Bag at 08/22/22 1401    morphine (PF) injection 2 mg, 2 mg, IntraVENous, Q4H PRN, Severiano Wiley, DO, 2 mg at 08/21/22 0501    rosuvastatin (CRESTOR) tablet 20 mg, 20 mg, Oral, Daily, Severiano Wiley DO, 20 mg at 08/23/22 0187    sodium chloride flush 0.9 % injection 5-40 mL, 5-40 mL, IntraVENous, 2 times per day, Jennifer Shree, DO, 10 mL at 08/22/22 2118    sodium chloride flush 0.9 % injection 5-40 mL, 5-40 mL, IntraVENous, PRN, Severiano Wiley, DO    0.9 % sodium chloride infusion, , IntraVENous, PRN, Severiano Wiley, DO    enoxaparin Sodium (LOVENOX) injection 30 mg, 30 mg, SubCUTAneous, BID, Severiano Wiley DO, 30 mg at 08/23/22 0728    ondansetron (ZOFRAN-ODT) disintegrating tablet 4 mg, 4 mg, Oral, Q8H PRN **OR** ondansetron (ZOFRAN) injection 4 mg, 4 mg, IntraVENous, Q6H PRN, Severiano Wiley, DO, 4 mg at 08/21/22 6542    polyethylene glycol (GLYCOLAX) packet 17 g, 17 g, Oral, Daily PRN, Severiano Wiley, DO    acetaminophen (TYLENOL) tablet 650 mg, 650 mg, Oral, Q6H PRN, 650 mg at 08/22/22 2127 **OR** acetaminophen (TYLENOL) suppository 650 mg, 650 mg, Rectal, Q6H PRN, Severiano Wiley, DO    0.9 % sodium chloride infusion, , IntraVENous, Continuous, Severiano Wiley DO, Last Rate: 500 mL/hr at 08/22/22 1112, Rate Change at 08/22/22 1112    oxyCODONE-acetaminophen (PERCOCET) 5-325 MG per tablet 1 tablet, 1 tablet, Oral, Q4H PRN, Severiano Wiley DO, 1 tablet at 08/21/22 0744    cefTRIAXone (ROCEPHIN) 1,000 mg in sterile water 10 mL IV syringe, 1,000 mg, IntraVENous, Q24H, Severiano Wiley DO, 1,000 mg at 08/22/22 2119    pantoprazole (PROTONIX) tablet 40 mg, 40 mg, Oral, QAM AC, Severiano Wiley DO, 40 mg at 08/23/22 0547    losartan (COZAAR) tablet 100 mg, 100 mg, Oral, Daily, Severiano Wiley DO, 100 mg at 08/23/22 0728    ADULT DIET; Regular    US GUIDED NEEDLE PLACEMENT   Final Result   1. Successful uncomplicated ultrasound-guided core biopsy of a mass          US BIOPSY LYMPH NODE   Final Result   1. Successful uncomplicated ultrasound-guided core biopsy of a mass          FL RETROGRADE PYELOGRAM W WO KUB   Final Result   Intraprocedural fluoroscopic spot images as above. See separate procedure   report for more information. CT ABDOMEN PELVIS WO CONTRAST Additional Contrast? None   Final Result   1. Right obstructive uropathy related to a 6 mm stone in the right mid   ureter. Left nephrolithiasis but no evidence of left obstructive uropathy. 2. Extensive lymphadenopathy worse in the right lower iliac region with a   taye mass measuring 5.2 x 3.3 cm. This was not previously seen. There is   also retroperitoneal lymphadenopathy and high right iliac lymphadenopathy. There is suspicion for possible lymphoma. 3. Fatty liver but no focal disease. RECOMMENDATIONS:   Workup for possible lymphoma with tissue biopsy of the area of   lymphadenopathy. Assessment:    Principal Problem:    Ureterolithiasis  Active Problems:    Ureteral stone  Resolved Problems:    * No resolved hospital problems. *      Plan:  Discharge home with outpatient follow-up  Await biopsy results  Sophy Muse MD  12:31 PM  8/23/2022    NOTE: This report was transcribed using voice recognition software.  Every effort was made to ensure accuracy; however, inadvertent transcription errors may be present

## 2022-08-24 LAB
BETA-2 MICROGLOBULIN: 1.8 MG/L (ref 0.6–2.4)
URINE CULTURE, ROUTINE: NORMAL

## 2022-08-25 LAB
Lab: NORMAL
REPORT: NORMAL
THIS TEST SENT TO: NORMAL

## 2022-09-06 NOTE — DISCHARGE SUMMARY
[address]                  [address] [facility],  [facility]                               DISCHARGE SUMMARY    PATIENT NAME: Prince Alatorre                      :        1960  MED REC NO:   [id number]                            ROOM:       8403  ACCOUNT NO:   [de-identified]                           ADMIT DATE: 2022  PROVIDER:     Megan Rivers MD                  DISCHARGE DATE:  2022    FINAL DISCHARGE DIAGNOSES:  Ureteral calculus and inguinal mass. HISTORY:  This 70-year-old male presented to the hospital with severe  flank pain, was found to have an obstructing calculus. He also was  found to have some inguinal adenopathy. The patient was brought to the  operating room. A stent was placed on his right side. The patient also  was subsequently had aspiration of enlarged inguinal lymph nodes by Dr. Laureen Maza. The results are pending at this time. The patient was discharged  from the hospital; will be brought back for ureteroscopy, stone removal  and for followup on his lymph node biopsy.     Condition stable  Pt discharged to home   offfice follow up    Dayanara Tijerina MD    D: 2022 7:49:42       T: 2022 7:52:09     [id number]  Job#: [id number]     Doc#: [id number]

## 2022-09-11 ENCOUNTER — HOSPITAL ENCOUNTER (OUTPATIENT)
Dept: MRI IMAGING | Age: 62
Discharge: HOME | End: 2022-09-13
Payer: COMMERCIAL

## 2022-09-11 DIAGNOSIS — C34.90 MALIGNANT NEOPLASM OF BRONCHUS AND LUNG (HCC): ICD-10-CM

## 2022-09-11 PROCEDURE — 70553 MRI BRAIN STEM W/O & W/DYE: CPT

## 2022-09-11 PROCEDURE — 6360000004 HC RX CONTRAST MEDICATION: Performed by: RADIOLOGY

## 2022-09-11 PROCEDURE — A9577 INJ MULTIHANCE: HCPCS | Performed by: RADIOLOGY

## 2022-09-11 RX ADMIN — GADOBENATE DIMEGLUMINE 20 ML: 529 INJECTION, SOLUTION INTRAVENOUS at 08:48

## 2022-10-31 ENCOUNTER — HOSPITAL ENCOUNTER (OUTPATIENT)
Age: 62
Discharge: HOME | End: 2022-11-02
Payer: COMMERCIAL

## 2022-10-31 ENCOUNTER — HOSPITAL ENCOUNTER (OUTPATIENT)
Dept: CT IMAGING | Age: 62
Discharge: HOME | End: 2022-11-02
Payer: COMMERCIAL

## 2022-10-31 ENCOUNTER — HOSPITAL ENCOUNTER (OUTPATIENT)
Age: 62
Discharge: HOME | End: 2022-10-31
Payer: COMMERCIAL

## 2022-10-31 DIAGNOSIS — R10.84 GENERALIZED ABDOMINAL PAIN: ICD-10-CM

## 2022-10-31 DIAGNOSIS — N20.1 URETEROLITHIASIS: ICD-10-CM

## 2022-10-31 PROBLEM — C4A.9 MERKEL CELL CANCER (HCC): Status: ACTIVE | Noted: 2022-10-31

## 2022-10-31 LAB
ALBUMIN SERPL-MCNC: 4.3 G/DL (ref 3.5–5.2)
ALP BLD-CCNC: 63 U/L (ref 40–129)
ALT SERPL-CCNC: 32 U/L (ref 0–40)
ANION GAP SERPL CALCULATED.3IONS-SCNC: 15 MMOL/L (ref 7–16)
ANISOCYTOSIS: ABNORMAL
AST SERPL-CCNC: 21 U/L (ref 0–39)
BASOPHILS ABSOLUTE: 0 E9/L (ref 0–0.2)
BASOPHILS RELATIVE PERCENT: 0.6 % (ref 0–2)
BILIRUB SERPL-MCNC: 0.7 MG/DL (ref 0–1.2)
BUN BLDV-MCNC: 12 MG/DL (ref 6–23)
CALCIUM SERPL-MCNC: 10.2 MG/DL (ref 8.6–10.2)
CHLORIDE BLD-SCNC: 100 MMOL/L (ref 98–107)
CO2: 21 MMOL/L (ref 22–29)
CREAT SERPL-MCNC: 0.9 MG/DL (ref 0.7–1.2)
EOSINOPHILS ABSOLUTE: 0.17 E9/L (ref 0.05–0.5)
EOSINOPHILS RELATIVE PERCENT: 0.9 % (ref 0–6)
GFR SERPL CREATININE-BSD FRML MDRD: >60 ML/MIN/1.73
GLUCOSE BLD-MCNC: 119 MG/DL (ref 74–99)
HCT VFR BLD CALC: 41.1 % (ref 37–54)
HEMOGLOBIN: 14 G/DL (ref 12.5–16.5)
LYMPHOCYTES ABSOLUTE: 1.32 E9/L (ref 1.5–4)
LYMPHOCYTES RELATIVE PERCENT: 6.9 % (ref 20–42)
MCH RBC QN AUTO: 30.6 PG (ref 26–35)
MCHC RBC AUTO-ENTMCNC: 34.1 % (ref 32–34.5)
MCV RBC AUTO: 89.7 FL (ref 80–99.9)
METAMYELOCYTES RELATIVE PERCENT: 0.9 % (ref 0–1)
MONOCYTES ABSOLUTE: 1.88 E9/L (ref 0.1–0.95)
MONOCYTES RELATIVE PERCENT: 9.5 % (ref 2–12)
MYELOCYTE PERCENT: 0.9 % (ref 0–0)
NEUTROPHILS ABSOLUTE: 15.6 E9/L (ref 1.8–7.3)
NEUTROPHILS RELATIVE PERCENT: 80.9 % (ref 43–80)
OVALOCYTES: ABNORMAL
PDW BLD-RTO: 15.3 FL (ref 11.5–15)
PLATELET # BLD: 153 E9/L (ref 130–450)
PMV BLD AUTO: 11.2 FL (ref 7–12)
POIKILOCYTES: ABNORMAL
POLYCHROMASIA: ABNORMAL
POTASSIUM SERPL-SCNC: 3.9 MMOL/L (ref 3.5–5)
RBC # BLD: 4.58 E12/L (ref 3.8–5.8)
SODIUM BLD-SCNC: 136 MMOL/L (ref 132–146)
TOTAL PROTEIN: 7.6 G/DL (ref 6.4–8.3)
WBC # BLD: 18.8 E9/L (ref 4.5–11.5)

## 2022-10-31 PROCEDURE — 6360000004 HC RX CONTRAST MEDICATION: Performed by: RADIOLOGY

## 2022-10-31 PROCEDURE — 36415 COLL VENOUS BLD VENIPUNCTURE: CPT

## 2022-10-31 PROCEDURE — 80053 COMPREHEN METABOLIC PANEL: CPT

## 2022-10-31 PROCEDURE — 85025 COMPLETE CBC W/AUTO DIFF WBC: CPT

## 2022-10-31 PROCEDURE — 74178 CT ABD&PLV WO CNTR FLWD CNTR: CPT

## 2022-10-31 RX ADMIN — IOPAMIDOL 75 ML: 755 INJECTION, SOLUTION INTRAVENOUS at 17:06

## 2022-11-08 ENCOUNTER — HOSPITAL ENCOUNTER (OUTPATIENT)
Age: 62
Discharge: HOME | End: 2022-11-08

## 2022-12-06 LAB
ALBUMIN SERPL-MCNC: 4.4 G/DL (ref 3.5–5.2)
ALP BLD-CCNC: 50 U/L (ref 40–129)
ALT SERPL-CCNC: 31 U/L (ref 0–40)
ANION GAP SERPL CALCULATED.3IONS-SCNC: 10 MMOL/L (ref 7–16)
AST SERPL-CCNC: 27 U/L (ref 0–39)
BILIRUB SERPL-MCNC: 0.4 MG/DL (ref 0–1.2)
BUN BLDV-MCNC: 22 MG/DL (ref 6–23)
CALCIUM SERPL-MCNC: 9.7 MG/DL (ref 8.6–10.2)
CHLORIDE BLD-SCNC: 101 MMOL/L (ref 98–107)
CHOLESTEROL, TOTAL: 159 MG/DL (ref 0–199)
CO2: 27 MMOL/L (ref 22–29)
CREAT SERPL-MCNC: 1.1 MG/DL (ref 0.7–1.2)
GFR SERPL CREATININE-BSD FRML MDRD: >60 ML/MIN/1.73
GLUCOSE BLD-MCNC: 113 MG/DL (ref 74–99)
POTASSIUM SERPL-SCNC: 4.2 MMOL/L (ref 3.5–5)
SODIUM BLD-SCNC: 138 MMOL/L (ref 132–146)
TOTAL PROTEIN: 6.8 G/DL (ref 6.4–8.3)
TRIGL SERPL-MCNC: 85 MG/DL (ref 0–149)

## 2023-11-21 ENCOUNTER — OFFICE VISIT (OUTPATIENT)
Dept: PODIATRY | Age: 63
End: 2023-11-21
Payer: COMMERCIAL

## 2023-11-21 VITALS — WEIGHT: 275 LBS | HEIGHT: 70 IN | BODY MASS INDEX: 39.37 KG/M2

## 2023-11-21 DIAGNOSIS — R26.2 DIFFICULTY WALKING: ICD-10-CM

## 2023-11-21 DIAGNOSIS — M79.672 PAIN IN BOTH FEET: ICD-10-CM

## 2023-11-21 DIAGNOSIS — M19.079 ARTHRITIS OF FOOT: Primary | ICD-10-CM

## 2023-11-21 DIAGNOSIS — M79.671 RIGHT FOOT PAIN: ICD-10-CM

## 2023-11-21 DIAGNOSIS — M79.671 PAIN IN BOTH FEET: ICD-10-CM

## 2023-11-21 DIAGNOSIS — G57.80 INTERDIGITAL NEUROMA OF FOOT: ICD-10-CM

## 2023-11-21 DIAGNOSIS — M79.672 LEFT FOOT PAIN: Primary | ICD-10-CM

## 2023-11-21 PROCEDURE — 99203 OFFICE O/P NEW LOW 30 MIN: CPT | Performed by: PODIATRIST

## 2023-11-21 NOTE — PROGRESS NOTES
23     Emily David    : 1960 Sex: male   Age: 61 y.o. Patient was referred by: None  Patient's PCP/Provider is:  Serenity Chau MD    Subjective:    Patient seen today for evaluation regarding bilateral foot pain    Chief Complaint   Patient presents with    Foot Pain     Bilateral foot        HPI: Patient stated issues have been getting progressively worse over the last several months. Patient states he feels like he is walking on a pebble on both forefoot regions. Patient knows he has arthritis and has had multiple cortisone injections into the great toe joints over the last several years by another [facility] physician. Patient presented today to discuss additional treatment options available for care. No other additional abnormalities noted. ROS:  Const: Positives and pertinent negatives as per HPI. Musculo: Denies symptoms other than stated above. Neuro: Denies symptoms other than stated above. Skin: Denies symptoms other than stated above. Current Medications:    Current Outpatient Medications:     omeprazole (PRILOSEC) 20 MG delayed release capsule, Take 1 capsule by mouth daily, Disp: 90 capsule, Rfl: 3    fenofibrate (TRIGLIDE) 160 MG tablet, TAKE 1 TABLET DAILY, Disp: 90 tablet, Rfl: 3    rosuvastatin (CRESTOR) 20 MG tablet, Take 1 tablet by mouth daily, Disp: 90 tablet, Rfl: 3    telmisartan (MICARDIS) 80 MG tablet, Take 1 tablet by mouth daily, Disp: 90 tablet, Rfl: 3    Cholecalciferol (VITAMIN D3) 5000 units TABS, Take 1 tablet by mouth daily, Disp: , Rfl:     Omega-3 Fatty Acids (FISH OIL) 1000 MG CAPS, Take 1 capsule by mouth daily, Disp: , Rfl:     CPAP Machine MISC, Please provide patient with an auto CPAP with ranges 5-15 cm water pressure with C-Flex of 3 and heated humidification. Please also provide mask, tubing, and supplies to patient fit and comfort.  Dx:BUTCH Orders faxed to [facility] [place], [place], Disp: 1 each, Rfl: 0    aspirin 81 MG EC tablet, Take 81 mg by mouth daily

## 2023-11-21 NOTE — PROGRESS NOTES
Patient is in today for evaluation of bilateral foot pain. Patient says he has been getting injections in the great toes. Patient also says he feels like he is walking on a pebble.  Pcp is Vashti Griffin MD  Last ov 10/31/23

## 2023-12-07 ENCOUNTER — TELEPHONE (OUTPATIENT)
Dept: INTERVENTIONAL RADIOLOGY/VASCULAR | Age: 63
End: 2023-12-07

## 2023-12-07 NOTE — TELEPHONE ENCOUNTER
Fax correspondence to Dr. Caldwell Him has been scanned into Media from IR Dr. Shaun Rowell RE:  Right Iliac LN biopsy. Paperwork in the cancel file.   Electronically signed by Fior Fonseca on 12/7/2023 at 2:26 PM

## 2023-12-11 ENCOUNTER — HOSPITAL ENCOUNTER (OUTPATIENT)
Dept: CT IMAGING | Age: 63
Discharge: HOME | End: 2023-12-13
Payer: COMMERCIAL

## 2023-12-11 VITALS
HEART RATE: 94 BPM | RESPIRATION RATE: 22 BRPM | OXYGEN SATURATION: 100 % | WEIGHT: 280 LBS | DIASTOLIC BLOOD PRESSURE: 103 MMHG | BODY MASS INDEX: 40.09 KG/M2 | TEMPERATURE: 97.9 F | HEIGHT: 70 IN | SYSTOLIC BLOOD PRESSURE: 164 MMHG

## 2023-12-11 DIAGNOSIS — C4A.71: ICD-10-CM

## 2023-12-11 LAB
INR PPP: 1.1
PROTHROMBIN TIME: 12.3 SEC (ref 9.3–12.4)

## 2023-12-11 PROCEDURE — 32408 CORE NDL BX LNG/MED PERQ: CPT

## 2023-12-11 PROCEDURE — 85610 PROTHROMBIN TIME: CPT

## 2023-12-11 PROCEDURE — 36415 COLL VENOUS BLD VENIPUNCTURE: CPT | Performed by: RADIOLOGY

## 2023-12-11 NOTE — BRIEF OP NOTE
Brief Postoperative Note  ______________________________________________________________       [id number] [place] [address]    Patient Name: Penny Steele   YOB: 1960  Medical Record Number: [id number]  Date of Procedure: 12/11/23  Room/Bed: Room/bed info not found    Pre-operative Diagnosis and Procedure: right lung mass    Post-operative Diagnosis: Resolved right lung mass    Consent: INFORMED CONSENT WAS OBTAINED BY patient, RISK AND BENEFITS WERE DISCUSSED. Anesthesia: non    Estimated Blood Loss: < 10 cc    Performed by: Brittaney Khan MD.    Complications: none    Specimen obtained: none    Findings: right lung mass has resolved. No Bx performed.      Electronically signed by Brittaney Khan MD   DD: 12/11/23  12:26 PM

## 2023-12-11 NOTE — PRE SEDATION
PRE-SEDATION ASSESSMENT NOTE    Patient Name: Kenna Ramirze   Medical Record Number: [id number]  Date: 12/11/23   Time: 12:25 PM EST   Room/Bed: Room/bed info not found  Admitting Diagnosis: <principal problem not specified>    1. HISTORY & PHYSICAL EXAMINATION:  Comments: none    Vitals:    12/11/23 1222   BP: (!) 164/103   Pulse: 94   Resp: 22   Temp:    SpO2: 100%       Allergies: Patient has no known allergies. 2. Heart and Lungs immediately prior to procedure demonstrate no contraindications to proceed    Drug: unknown  Tobacco: unknown    3. PAST ANESTHESIA EXPERIENCE:  No previous History. 4. AIRWAY/TEETH/HEAD & NECK(Mallampati Classification):  II (soft palate, uvula, fauces visible)    5: NORMAL RANGE OF MOTION OF NECK: No    6. PATIENT WILL LIKELY TOLERATE PLAN OF MODERATE SEDATION    7. ASA 2.     Electronically signed by Petra Mcfarland MD

## 2023-12-11 NOTE — PROGRESS NOTES
Patient arrived with family to Radiology department for Lung Biopsy. Allergies, home medications, H&P and fasting instructions reviewed with patient. Vital signs taken. 20ga IV placed, blood obtained, IV flushed and prn adapter attached. Blood sample sent to lab for ordered tests. Procedural instructions given, questions answered, understanding expressed and consent signed. Patient given fluoroscopy education, no questions at this time.

## 2023-12-18 NOTE — PROGRESS NOTES
[facility]                                                            PROGRESS NOTE                                                                       2022          SUBJECTIVE:    Afebrile  Pt feels well    OBJECTIVE:    BP (!) 149/91   Pulse 73   Temp 98.1 °F (36.7 °C) (Oral)   Resp 18   Ht 5' 10\" (1.778 m)   Wt 270 lb (122.5 kg)   SpO2 96%   BMI 38.74 kg/m²     PHYSICAL EXAMINATION:  Skin: dry, without rashes  Respirations: non-labored, intact  Abdomen: soft, non-tender, non-distended      Lab Results   Component Value Date    WBC 15.5 (H) 2022    HGB 14.7 2022    HCT 44.1 2022    MCV 88.6 2022     2022       Lab Results   Component Value Date    CREATININE 1.3 (H) 2022       Lab Results   Component Value Date    PSA 1.06 2017       REVIEW OF SYSTEMS:    CONSTITUTIONAL: negative  HEENT: negative  HEMATOLOGIC: negative  ENDOCRINE: negative  RESPIRATORY: negative  CV: negative  GI: negative  NEURO: negative  ORTHOPEDICS: negative  PSYCHIATRIC: negative  : as above    PAST FAMILY HISTORY:    Family History   Problem Relation Age of Onset    Other Mother         dementia    Heart Disease Mother     Heart Disease Father     No Known Problems Sister     No Known Problems Brother     No Known Problems Sister     No Known Problems Sister      PAST SOCIAL HISTORY:    Social History     Socioeconomic History    Marital status:      Spouse name: None    Number of children: 0    Years of education: None    Highest education level: None   Occupational History    Occupation: / owner    Tobacco Use    Smoking status: Former     Packs/day: 3.00     Years: 20.00     Pack years: 60.00     Types: Cigars, Cigarettes     Quit date: 10/3/1994     Years since quittin.9    Smokeless tobacco: Never    Tobacco comments:     smokes 2 cigars a month and quit cigs 23 years ago   Substance and Sexual No

## 2024-01-11 LAB
ALBUMIN SERPL-MCNC: 4.3 G/DL (ref 3.5–5.2)
ALP SERPL-CCNC: 77 U/L (ref 40–129)
ALT SERPL-CCNC: 44 U/L (ref 0–40)
ANION GAP SERPL CALCULATED.3IONS-SCNC: 12 MMOL/L (ref 7–16)
AST SERPL-CCNC: 37 U/L (ref 0–39)
BILIRUB SERPL-MCNC: 0.3 MG/DL (ref 0–1.2)
BUN SERPL-MCNC: 17 MG/DL (ref 6–23)
CALCIUM SERPL-MCNC: 9.8 MG/DL (ref 8.6–10.2)
CHLORIDE SERPL-SCNC: 103 MMOL/L (ref 98–107)
CO2 SERPL-SCNC: 23 MMOL/L (ref 22–29)
CREAT SERPL-MCNC: 0.9 MG/DL (ref 0.7–1.2)
GFR SERPL CREATININE-BSD FRML MDRD: >60 ML/MIN/1.73M2
GLUCOSE SERPL-MCNC: 124 MG/DL (ref 74–99)
POTASSIUM SERPL-SCNC: 4.3 MMOL/L (ref 3.5–5)
PROT SERPL-MCNC: 7 G/DL (ref 6.4–8.3)
SODIUM SERPL-SCNC: 138 MMOL/L (ref 132–146)

## 2024-02-01 ENCOUNTER — OFFICE VISIT (OUTPATIENT)
Dept: PODIATRY | Age: 64
End: 2024-02-01
Payer: COMMERCIAL

## 2024-02-01 VITALS — WEIGHT: 280 LBS | HEIGHT: 70 IN | BODY MASS INDEX: 40.09 KG/M2

## 2024-02-01 DIAGNOSIS — M79.672 PAIN IN BOTH FEET: ICD-10-CM

## 2024-02-01 DIAGNOSIS — M79.671 PAIN IN BOTH FEET: ICD-10-CM

## 2024-02-01 DIAGNOSIS — M19.079 ARTHRITIS OF FOOT: Primary | ICD-10-CM

## 2024-02-01 DIAGNOSIS — G57.80 INTERDIGITAL NEUROMA OF FOOT: ICD-10-CM

## 2024-02-01 DIAGNOSIS — R26.2 DIFFICULTY WALKING: ICD-10-CM

## 2024-02-01 PROCEDURE — 99213 OFFICE O/P EST LOW 20 MIN: CPT | Performed by: PODIATRIST

## 2024-02-01 NOTE — PROGRESS NOTES
24     Ned Quintana    : 1960   Sex: male    Age: 63 y.o.    Patient's PCP/Provider is:  Dann Gregory MD    Subjective:  Patient is seen today for follow-up regarding continued care regarding arthritic issues into both lower extremities and neuroma issues.  Overall patient is doing great at this time with use of the OTC insoles and sandals as recommended for home use.  Patient is very pleased with symptom resolution at this time.  No other additional abnormalities noted.    Chief Complaint   Patient presents with    Foot Pain     Bilateral foot        ROS:  Const: Positives and pertinent negatives as per HPI.    Musculo: Denies symptoms other than stated above.  Neuro: Denies symptoms other than stated above.  Skin: Denies symptoms other than stated above.    Current Medications:    Current Outpatient Medications:     rosuvastatin (CRESTOR) 20 MG tablet, Take 1 tablet by mouth daily, Disp: 90 tablet, Rfl: 3    fenofibrate (TRIGLIDE) 160 MG tablet, TAKE 1 TABLET DAILY, Disp: 90 tablet, Rfl: 3    telmisartan (MICARDIS) 80 MG tablet, Take 1 tablet by mouth daily, Disp: 90 tablet, Rfl: 3    omeprazole (PRILOSEC) 20 MG delayed release capsule, Take 1 capsule by mouth daily, Disp: 90 capsule, Rfl: 3    Cholecalciferol (VITAMIN D3) 5000 units TABS, Take 1 tablet by mouth daily, Disp: , Rfl:     Omega-3 Fatty Acids (FISH OIL) 1000 MG CAPS, Take 1 capsule by mouth daily, Disp: , Rfl:     CPAP Machine MISC, Please provide patient with an auto CPAP with ranges 5-15 cm water pressure with C-Flex of 3 and heated humidification. Please also provide mask, tubing, and supplies to patient fit and comfort. Dx:BUTCH Orders faxed to [facility] [place], Disp: 1 each, Rfl: 0    Allergies:  No Known Allergies    Vitals:    24 1300   Weight: 127 kg (280 lb)   Height: 1.778 m (5' 10\")       Exam:  Neurovascular status unchanged.  Arthritic issues stable to both lower extremities.  Neuroma issues resolved forefoot regions.

## 2024-02-01 NOTE — PROGRESS NOTES
Patient is in today for 2 month bilateral foot pain. Patient says the inserts have been helping his feet. Pcp is Dann Gregory MD  Last ov 12/4/23

## 2025-07-08 ENCOUNTER — HOSPITAL ENCOUNTER (OUTPATIENT)
Age: 65
Discharge: HOME | End: 2025-07-10

## 2025-07-12 LAB
SEND OUT REPORT: NORMAL
TEST NAME: NORMAL

## 2025-07-14 LAB — SURGICAL PATHOLOGY REPORT: NORMAL

## 2025-07-25 ENCOUNTER — TELEPHONE (OUTPATIENT)
Dept: SLEEP CENTER | Age: 65
End: 2025-07-25

## 2025-07-28 ENCOUNTER — TELEPHONE (OUTPATIENT)
Dept: SLEEP CENTER | Age: 65
End: 2025-07-28

## 2025-07-31 ENCOUNTER — CLINICAL DOCUMENTATION (OUTPATIENT)
Dept: SURGERY | Age: 65
End: 2025-07-31

## 2025-07-31 NOTE — PROGRESS NOTES
MA contacted the patient to schedule referral. The patient was under the impression he was not being seen locally. Per patient, he reached back out to his referring provider who did correct the referral. Patient kindly called me back and updated me that he would be going to [facility] for this problem. Referral was updated.     Electronically signed by Evelyn Reynaga MA on 7/31/2025 at 9:18 AM

## (undated) DEVICE — DEVICE TORQ KENDALL 0025IN 0038IN

## (undated) DEVICE — SOLUTION IRRIG 3000ML 0.9% SOD CHL USP UROMATIC PLAS CONT

## (undated) DEVICE — SOLUTION IRRIG 3000ML STRL H2O USP UROMATIC PLAS CONT

## (undated) DEVICE — GUIDEWIRE ENDOSCP ANGLED 8 CM 0.035 INX150 CM BENT ZIPWIRE

## (undated) DEVICE — GOWN,SIRUS,FABRNF,XL,20/CS: Brand: MEDLINE

## (undated) DEVICE — CATHETER URET OLV TIP 5FRX70CM FLEXIMA

## (undated) DEVICE — GUIDEWIRE VASC STR 3 CM 0.035 INX150 CM STD NIT ZIPWIRE

## (undated) DEVICE — CATHETER URET 5FR L70CM OPN END SGL LUMN INJ HUB FLEXIMA

## (undated) DEVICE — MEDIA CONTRAST ISOVUE  300 10X50ML

## (undated) DEVICE — GLOVE ORANGE PI 7 1/2   MSG9075

## (undated) DEVICE — SOLUTION IRRIG 1000ML STRL H2O USP PLAS POUR BTL

## (undated) DEVICE — CYSTO: Brand: MEDLINE INDUSTRIES, INC.